# Patient Record
Sex: FEMALE | Race: WHITE | NOT HISPANIC OR LATINO
[De-identification: names, ages, dates, MRNs, and addresses within clinical notes are randomized per-mention and may not be internally consistent; named-entity substitution may affect disease eponyms.]

---

## 2017-02-13 ENCOUNTER — APPOINTMENT (OUTPATIENT)
Dept: ORTHOPEDIC SURGERY | Facility: CLINIC | Age: 62
End: 2017-02-13

## 2017-03-04 ENCOUNTER — EMERGENCY (EMERGENCY)
Facility: HOSPITAL | Age: 62
LOS: 1 days | Discharge: HOME | End: 2017-03-04
Attending: EMERGENCY MEDICINE | Admitting: EMERGENCY MEDICINE
Payer: COMMERCIAL

## 2017-03-04 VITALS
TEMPERATURE: 99 F | HEART RATE: 86 BPM | RESPIRATION RATE: 18 BRPM | OXYGEN SATURATION: 96 % | SYSTOLIC BLOOD PRESSURE: 119 MMHG | DIASTOLIC BLOOD PRESSURE: 84 MMHG

## 2017-03-04 DIAGNOSIS — K57.32 DIVERTICULITIS OF LARGE INTESTINE WITHOUT PERFORATION OR ABSCESS WITHOUT BLEEDING: ICD-10-CM

## 2017-03-04 DIAGNOSIS — Z90.49 ACQUIRED ABSENCE OF OTHER SPECIFIED PARTS OF DIGESTIVE TRACT: ICD-10-CM

## 2017-03-04 DIAGNOSIS — E78.5 HYPERLIPIDEMIA, UNSPECIFIED: ICD-10-CM

## 2017-03-04 DIAGNOSIS — Z90.89 ACQUIRED ABSENCE OF OTHER ORGANS: ICD-10-CM

## 2017-03-04 DIAGNOSIS — R10.32 LEFT LOWER QUADRANT PAIN: ICD-10-CM

## 2017-03-04 DIAGNOSIS — Z90.710 ACQUIRED ABSENCE OF BOTH CERVIX AND UTERUS: ICD-10-CM

## 2017-03-04 LAB
ALBUMIN SERPL ELPH-MCNC: 4.4 G/DL — SIGNIFICANT CHANGE UP (ref 3.3–5)
ALP SERPL-CCNC: 68 U/L — SIGNIFICANT CHANGE UP (ref 40–120)
ALT FLD-CCNC: 16 U/L RC — SIGNIFICANT CHANGE UP (ref 10–45)
ANION GAP SERPL CALC-SCNC: 16 MMOL/L — SIGNIFICANT CHANGE UP (ref 5–17)
APTT BLD: 27.9 SEC — SIGNIFICANT CHANGE UP (ref 27.5–37.4)
AST SERPL-CCNC: 61 U/L — HIGH (ref 10–40)
BASOPHILS # BLD AUTO: 0 K/UL — SIGNIFICANT CHANGE UP (ref 0–0.2)
BASOPHILS NFR BLD AUTO: 0.2 % — SIGNIFICANT CHANGE UP (ref 0–2)
BILIRUB SERPL-MCNC: 0.5 MG/DL — SIGNIFICANT CHANGE UP (ref 0.2–1.2)
BUN SERPL-MCNC: 17 MG/DL — SIGNIFICANT CHANGE UP (ref 7–23)
CALCIUM SERPL-MCNC: 9.1 MG/DL — SIGNIFICANT CHANGE UP (ref 8.4–10.5)
CHLORIDE SERPL-SCNC: 100 MMOL/L — SIGNIFICANT CHANGE UP (ref 96–108)
CO2 SERPL-SCNC: 22 MMOL/L — SIGNIFICANT CHANGE UP (ref 22–31)
CREAT SERPL-MCNC: 0.77 MG/DL — SIGNIFICANT CHANGE UP (ref 0.5–1.3)
EOSINOPHIL # BLD AUTO: 0 K/UL — SIGNIFICANT CHANGE UP (ref 0–0.5)
EOSINOPHIL NFR BLD AUTO: 0.3 % — SIGNIFICANT CHANGE UP (ref 0–6)
GAS PNL BLDV: SIGNIFICANT CHANGE UP
GLUCOSE SERPL-MCNC: 103 MG/DL — HIGH (ref 70–99)
HCT VFR BLD CALC: 39 % — SIGNIFICANT CHANGE UP (ref 34.5–45)
HGB BLD-MCNC: 14.1 G/DL — SIGNIFICANT CHANGE UP (ref 11.5–15.5)
INR BLD: 0.99 RATIO — SIGNIFICANT CHANGE UP (ref 0.88–1.16)
LIDOCAIN IGE QN: 51 U/L — SIGNIFICANT CHANGE UP (ref 7–60)
LYMPHOCYTES # BLD AUTO: 1.2 K/UL — SIGNIFICANT CHANGE UP (ref 1–3.3)
LYMPHOCYTES # BLD AUTO: 10.5 % — LOW (ref 13–44)
MAGNESIUM SERPL-MCNC: 2 MG/DL — SIGNIFICANT CHANGE UP (ref 1.6–2.6)
MCHC RBC-ENTMCNC: 33.9 PG — SIGNIFICANT CHANGE UP (ref 27–34)
MCHC RBC-ENTMCNC: 36.1 GM/DL — HIGH (ref 32–36)
MCV RBC AUTO: 93.8 FL — SIGNIFICANT CHANGE UP (ref 80–100)
MONOCYTES # BLD AUTO: 0.8 K/UL — SIGNIFICANT CHANGE UP (ref 0–0.9)
MONOCYTES NFR BLD AUTO: 6.7 % — SIGNIFICANT CHANGE UP (ref 2–14)
NEUTROPHILS # BLD AUTO: 9.2 K/UL — HIGH (ref 1.8–7.4)
NEUTROPHILS NFR BLD AUTO: 82.3 % — HIGH (ref 43–77)
PHOSPHATE SERPL-MCNC: 2.9 MG/DL — SIGNIFICANT CHANGE UP (ref 2.5–4.5)
PLATELET # BLD AUTO: 232 K/UL — SIGNIFICANT CHANGE UP (ref 150–400)
POTASSIUM SERPL-MCNC: 4.1 MMOL/L — SIGNIFICANT CHANGE UP (ref 3.5–5.3)
POTASSIUM SERPL-SCNC: 4.1 MMOL/L — SIGNIFICANT CHANGE UP (ref 3.5–5.3)
PROT SERPL-MCNC: 7 G/DL — SIGNIFICANT CHANGE UP (ref 6–8.3)
PROTHROM AB SERPL-ACNC: 10.8 SEC — SIGNIFICANT CHANGE UP (ref 10–13.1)
RBC # BLD: 4.16 M/UL — SIGNIFICANT CHANGE UP (ref 3.8–5.2)
RBC # FLD: 12.2 % — SIGNIFICANT CHANGE UP (ref 10.3–14.5)
SODIUM SERPL-SCNC: 138 MMOL/L — SIGNIFICANT CHANGE UP (ref 135–145)
WBC # BLD: 11.2 K/UL — HIGH (ref 3.8–10.5)
WBC # FLD AUTO: 11.2 K/UL — HIGH (ref 3.8–10.5)

## 2017-03-04 PROCEDURE — 93010 ELECTROCARDIOGRAM REPORT: CPT

## 2017-03-04 PROCEDURE — 74177 CT ABD & PELVIS W/CONTRAST: CPT | Mod: 26

## 2017-03-04 PROCEDURE — 99285 EMERGENCY DEPT VISIT HI MDM: CPT | Mod: 25

## 2017-03-04 RX ORDER — ONDANSETRON 8 MG/1
4 TABLET, FILM COATED ORAL ONCE
Qty: 0 | Refills: 0 | Status: COMPLETED | OUTPATIENT
Start: 2017-03-04 | End: 2017-03-04

## 2017-03-04 RX ORDER — MORPHINE SULFATE 50 MG/1
4 CAPSULE, EXTENDED RELEASE ORAL ONCE
Qty: 0 | Refills: 0 | Status: DISCONTINUED | OUTPATIENT
Start: 2017-03-04 | End: 2017-03-04

## 2017-03-04 RX ORDER — SODIUM CHLORIDE 9 MG/ML
2000 INJECTION INTRAMUSCULAR; INTRAVENOUS; SUBCUTANEOUS ONCE
Qty: 0 | Refills: 0 | Status: COMPLETED | OUTPATIENT
Start: 2017-03-04 | End: 2017-03-04

## 2017-03-04 RX ADMIN — SODIUM CHLORIDE 4000 MILLILITER(S): 9 INJECTION INTRAMUSCULAR; INTRAVENOUS; SUBCUTANEOUS at 22:26

## 2017-03-04 RX ADMIN — MORPHINE SULFATE 4 MILLIGRAM(S): 50 CAPSULE, EXTENDED RELEASE ORAL at 22:28

## 2017-03-04 RX ADMIN — ONDANSETRON 4 MILLIGRAM(S): 8 TABLET, FILM COATED ORAL at 21:58

## 2017-03-04 RX ADMIN — MORPHINE SULFATE 4 MILLIGRAM(S): 50 CAPSULE, EXTENDED RELEASE ORAL at 21:58

## 2017-03-04 NOTE — ED PROVIDER NOTE - ATTENDING CONTRIBUTION TO CARE
**ATTENDING ADDENDUM (Dr. Yoel Winter): I attest that I have directly examined this patient and reviewed and formulated the diagnostic and therapeutic management plan in collaboration with the EM resident. Please see MDM note and remainder of EMR for findings from CC, HPI, ROS, and PE. (STEFAN Landis)

## 2017-03-04 NOTE — ED PROVIDER NOTE - OBJECTIVE STATEMENT
61 year old female with PMHx of diverticulitis "6 times," HLD, PSHx of appendectomy, hysterectomy, presenting today with lower abdominal pain radiating to her lower back and rectum. Vomited twice, no diarrhea, no constipation, no fever, no dysuria, did not take anything for pain. 61 year old female with PMHx of diverticulitis "6 times," HLD, PSHx of appendectomy, hysterectomy, presenting today with lower abdominal pain radiating to her lower back and rectum. Vomited twice, no diarrhea, no constipation, no fever, no dysuria, did not take anything for pain.  **ATTENDING ADDENDUM (Dr. Yoel Winter): I attest that I have directly and personally interviewed and examined this patient and elicited a comparable history of present illness and review of systems as documented, along with my EM resident. I attest that I have made significant contributions to the documentation where necessary and as noted in the EMR. Of note, and in addition to the above, patient is a 61-year-old woman who follows with primary care physician/provider Du/Froilan who presents today with progressively worsening lower abdominal pain and tenderness. Reports multiple prior episodes of diverticulitis. POSITIVE history of hyperlipidemia, appendectomy, hysterectomy. NO history or memory of small bowel obstruction or perforation reported. POSITIVE relieved with antibiotics (sometimes inpatient, sometimes outpatient). NO fevers or chills. POSITIVE nausea. POSITIVE movement-associated, reproducible pain (with palpation and with percussion). NO recent antibiotics. DENIES recent travel. NO obvious sick contacts. Here for evaluation. VAS 4-5/10.

## 2017-03-04 NOTE — ED PROVIDER NOTE - CONDUCTED A DETAILED DISCUSSION WITH PATIENT AND/OR GUARDIAN REGARDING, MDM
lab results/**ATTENDING ADDENDUM (Dr. Yoel Winter): Anticipatory guidance provided./radiology results

## 2017-03-04 NOTE — ED PROVIDER NOTE - PLAN OF CARE
ATTENDING ADDENDUM (Dr. Yoel Winter): Goals of care include resolution of emergent/urgent symptoms and concerns, and restoration to baseline level of homeostasis. Take antibiotics (cipro and flagyl) as directed for 2 weeks. Drink plenty of fluids and get plenty of rest. Follow up with your primary doctor on Monday. Take Tylenol 1000 mg or ibuprofen 600 mg every 6 hours with food as needed for pain. Take oxycodone 5 mg every 6 hours as needed for severe pain with over-the-counter laxatives; do not drive or drink alcohol while taking this medication. Take Zofran 4mg dissolved under the tongue every 8 hours as needed for nausea. Return to the Emergency Dept if you develop any new or worsening symptoms

## 2017-03-04 NOTE — ED ADULT NURSE NOTE - PMH
Diverticulitis    Dyslipidemia    Endometriosis    Melanoma    Migraine headache    Mitral valve prolapse

## 2017-03-04 NOTE — ED PROVIDER NOTE - RESPIRATORY, MLM
Breath sounds clear and equal bilaterally. Breath sounds clear and equal bilaterally. **ATTENDING ADDENDUM (Dr. Yoel Winter): NO wheezing, rales, rhonchi, crackles, stridor, drooling, retractions, nasal flaring, or tripoding.

## 2017-03-04 NOTE — ED PROVIDER NOTE - PHYSICAL EXAMINATION
**ATTENDING ADDENDUM (Dr. Yoel Winter): I have reviewed and substantially contributed to the elements of the PE as documented above. I have directly performed an examination of this patient in conjunction with the other members (EM resident/PA/NP) of the patient care team. Of note, and in addition to the above, patient's abdominal exam reveals a soft but tender abdomen. POSITIVE tenderness with palpation and percussion along the bilateral lower quadrants and the suprapubic area ( left lower quadrant site of worst pain ). NO Hamm. NO pulsatile or non-pulsatile masses. NO hernias. NO obvious hepatosplenomegaly. POSITIVE guarding but without rigidity or rebound tenderness.

## 2017-03-04 NOTE — ED PROVIDER NOTE - GASTROINTESTINAL [+], MLM
ABDOMINAL PAIN/VOMITING ABDOMINAL PAIN/**ATTENDING ADDENDUM (Dr. Yoel Winter): lower quadrant abdominal pain/VOMITING

## 2017-03-04 NOTE — ED PROVIDER NOTE - PROGRESS NOTE DETAILS
**ATTENDING ADDENDUM (Dr. Yoel Winter): patient serially evaluated throughout ED course. NO acute deterioration up to this time in the ED. POSITIVE received analgesics, intravenous fluid rehydration, and antiemetics. Feeling improved, but with pain and tenderness. Agree with additional intravenous fluid rehydration and ice chips. Anticipatory guidance provided. Awaiting completion of ED diagnostics. Will continue to observe and monitor closely. **ATTENDING ADDENDUM (Dr. Yoel Winter): patient serially evaluated throughout ED course. NO acute deterioration up to this time in the ED. Formal reading of CT noted. All ED diagnostics reviewed with patient and with family. Agree with first dose of antibiotics (ciprofloxacin and metronidazole IV) in ED, with discharge home and close outpatient followup with primary care physician/provider while on oral antibiotics. Patient prefers discharge home over admission at this time. I believe patient is a candidate for outpatient management and followup. Return instructions provided at time of patient discharge. Anticipatory guidance provided. NO evidence of acute perforation, abscess, peritonitis, or acute surgical abdomen requiring admission at this time. **ATTENDING ADDENDUM (Dr. Yoel Winter): patient serially evaluated throughout ED course. NO acute deterioration up to this time in the ED. Received analgesics, intravenous fluid rehydration, and antiemetics. Feeling improved, but with pain and tenderness. Agree with additional intravenous fluid rehydration and ice chips. Anticipatory guidance provided. Awaiting completion of ED diagnostics. Will continue to observe and monitor closely.

## 2017-03-04 NOTE — ED ADULT NURSE NOTE - PSH
S/P appendectomy    S/P bladder repair    S/P     S/P hysterectomy    S/P reconstruction procedure  S/P Melanoma. Nose reconstruction surgery.

## 2017-03-04 NOTE — ED PROVIDER NOTE - EYES, MLM
Clear bilaterally, pupils equal, round and reactive to light. Clear bilaterally, pupils equal, round and reactive to light. **ATTENDING ADDENDUM (Dr. Yoel Winter): Extraocular muscle movements intact. NO nystagmus.

## 2017-03-04 NOTE — ED ADULT NURSE NOTE - OBJECTIVE STATEMENT
61 y.o female aaox3 ambulatory with c/o low abdominal pain radiating to back and rectal area just started today, h/o Diverticulitis, endometriosis, appendectomy, hysterectomy, C/S and HTN, with low grade temperature and nausea and vomiting. Patient denies any diarrhea or constipation. also c/o blood streak vomitus earlier.

## 2017-03-04 NOTE — ED PROVIDER NOTE - MEDICAL DECISION MAKING DETAILS
61 year old female presenting with lower abdominal pain that feels like prior episodes of diverticulitis. PMD Ryan Mcgovern 61 year old female presenting with lower abdominal pain that feels like prior episodes of diverticulitis. PMD Ryan Mcgovern  **ATTENDING MEDICAL DECISION MAKING/SYNTHESIS (Dr. Yoel Winter): I have reviewed the Chief Complaint, the HPI, the ROS, and have directly performed and confirmed the findings on the Physical Examination. I have reviewed the medical decision making with all providers, as applicable. The PROBLEM REPRESENTATION at this time is: 61-year-old woman with history of appendectomy, hysterectomy, hyperlipidemia, and multiple prior episodes of diverticulitis now with lower abdominal pain across both lower quadrants similar to prior exacerbations of diverticulitis. The MOST LIKELY DIAGNOSIS, and the LIST OF DIFFERENTIAL DIAGNOSES, includes (but is not limited to) the following: recurrent diverticulitis, perforation, peritonitis, other equivalent cause for acute surgical abdomen, other serious bacterial infection or sepsis/severe sepsis e.g. abscess (possible), small bowel obstruction, large bowel obstruction, volvulus, or acute intussusception (unlikely given presentation and clinical findings), gynecological/vascular etiology (unlikely given presentation and clinical findings), mesenteric ischemia or ischemic colitis (unlikely given presentation and clinical findings). The likelihood of each of these diagnoses has been appropriately considered in the context of this patient's presentation and my evaluation. PLAN: as described in EMR, including diagnostics, therapeutics and consultation as clinically warranted. I will continue to reevaluate the patient, including the results of all testing, and monitor response to therapy throughout the patient's course in the ED.

## 2017-03-04 NOTE — ED PROVIDER NOTE - CARE PLAN
Principal Discharge DX:	Left lower quadrant pain Principal Discharge DX:	Left lower quadrant pain  Goal:	ATTENDING ADDENDUM (Dr. Yoel Winter): Goals of care include resolution of emergent/urgent symptoms and concerns, and restoration to baseline level of homeostasis. Principal Discharge DX:	Left lower quadrant pain  Goal:	ATTENDING ADDENDUM (Dr. Yoel Winter): Goals of care include resolution of emergent/urgent symptoms and concerns, and restoration to baseline level of homeostasis.  Instructions for follow-up, activity and diet:	Take antibiotics (cipro and flagyl) as directed for 2 weeks. Drink plenty of fluids and get plenty of rest. Follow up with your primary doctor on Monday. Take Tylenol 1000 mg or ibuprofen 600 mg every 6 hours with food as needed for pain. Take oxycodone 5 mg every 6 hours as needed for severe pain with over-the-counter laxatives; do not drive or drink alcohol while taking this medication. Take Zofran 4mg dissolved under the tongue every 8 hours as needed for nausea. Return to the Emergency Dept if you develop any new or worsening symptoms Principal Discharge DX:	Left lower quadrant pain  Goal:	ATTENDING ADDENDUM (Dr. Yoel Winter): Goals of care include resolution of emergent/urgent symptoms and concerns, and restoration to baseline level of homeostasis.  Instructions for follow-up, activity and diet:	Take antibiotics (cipro and flagyl) as directed for 2 weeks. Drink plenty of fluids and get plenty of rest. Follow up with your primary doctor on Monday. Take Tylenol 1000 mg or ibuprofen 600 mg every 6 hours with food as needed for pain. Take oxycodone 5 mg every 6 hours as needed for severe pain with over-the-counter laxatives; do not drive or drink alcohol while taking this medication. Take Zofran 4mg dissolved under the tongue every 8 hours as needed for nausea. Return to the Emergency Dept if you develop any new or worsening symptoms  Secondary Diagnosis:	Diverticulitis of large intestine without perforation or abscess without bleeding

## 2017-03-05 VITALS
RESPIRATION RATE: 16 BRPM | TEMPERATURE: 99 F | DIASTOLIC BLOOD PRESSURE: 65 MMHG | SYSTOLIC BLOOD PRESSURE: 102 MMHG | OXYGEN SATURATION: 98 % | HEART RATE: 92 BPM

## 2017-03-05 LAB
APPEARANCE UR: CLEAR — SIGNIFICANT CHANGE UP
BILIRUB UR-MCNC: NEGATIVE — SIGNIFICANT CHANGE UP
COLOR SPEC: COLORLESS — SIGNIFICANT CHANGE UP
DIFF PNL FLD: NEGATIVE — SIGNIFICANT CHANGE UP
GLUCOSE UR QL: NEGATIVE — SIGNIFICANT CHANGE UP
KETONES UR-MCNC: NEGATIVE — SIGNIFICANT CHANGE UP
LEUKOCYTE ESTERASE UR-ACNC: NEGATIVE — SIGNIFICANT CHANGE UP
NITRITE UR-MCNC: NEGATIVE — SIGNIFICANT CHANGE UP
PH UR: 6.5 — SIGNIFICANT CHANGE UP (ref 4.8–8)
PROT UR-MCNC: NEGATIVE — SIGNIFICANT CHANGE UP
RBC CASTS # UR COMP ASSIST: SIGNIFICANT CHANGE UP /HPF (ref 0–2)
SP GR SPEC: 1.02 — SIGNIFICANT CHANGE UP (ref 1.01–1.02)
UROBILINOGEN FLD QL: NEGATIVE — SIGNIFICANT CHANGE UP
WBC UR QL: SIGNIFICANT CHANGE UP /HPF (ref 0–5)

## 2017-03-05 PROCEDURE — 74177 CT ABD & PELVIS W/CONTRAST: CPT

## 2017-03-05 PROCEDURE — 83690 ASSAY OF LIPASE: CPT

## 2017-03-05 PROCEDURE — 84295 ASSAY OF SERUM SODIUM: CPT

## 2017-03-05 PROCEDURE — 81001 URINALYSIS AUTO W/SCOPE: CPT

## 2017-03-05 PROCEDURE — 99284 EMERGENCY DEPT VISIT MOD MDM: CPT | Mod: 25

## 2017-03-05 PROCEDURE — 93005 ELECTROCARDIOGRAM TRACING: CPT

## 2017-03-05 PROCEDURE — 84132 ASSAY OF SERUM POTASSIUM: CPT

## 2017-03-05 PROCEDURE — 96374 THER/PROPH/DIAG INJ IV PUSH: CPT | Mod: XU

## 2017-03-05 PROCEDURE — 96375 TX/PRO/DX INJ NEW DRUG ADDON: CPT | Mod: XU

## 2017-03-05 PROCEDURE — 83605 ASSAY OF LACTIC ACID: CPT

## 2017-03-05 PROCEDURE — 82947 ASSAY GLUCOSE BLOOD QUANT: CPT

## 2017-03-05 PROCEDURE — 82330 ASSAY OF CALCIUM: CPT

## 2017-03-05 PROCEDURE — 96376 TX/PRO/DX INJ SAME DRUG ADON: CPT | Mod: XU

## 2017-03-05 PROCEDURE — 85730 THROMBOPLASTIN TIME PARTIAL: CPT

## 2017-03-05 PROCEDURE — 85014 HEMATOCRIT: CPT

## 2017-03-05 PROCEDURE — 84100 ASSAY OF PHOSPHORUS: CPT

## 2017-03-05 PROCEDURE — 82803 BLOOD GASES ANY COMBINATION: CPT

## 2017-03-05 PROCEDURE — 85610 PROTHROMBIN TIME: CPT

## 2017-03-05 PROCEDURE — 85027 COMPLETE CBC AUTOMATED: CPT

## 2017-03-05 PROCEDURE — 82435 ASSAY OF BLOOD CHLORIDE: CPT

## 2017-03-05 PROCEDURE — 83735 ASSAY OF MAGNESIUM: CPT

## 2017-03-05 PROCEDURE — 80053 COMPREHEN METABOLIC PANEL: CPT

## 2017-03-05 RX ORDER — MOXIFLOXACIN HYDROCHLORIDE TABLETS, 400 MG 400 MG/1
1 TABLET, FILM COATED ORAL
Qty: 28 | Refills: 0 | OUTPATIENT
Start: 2017-03-05 | End: 2017-03-19

## 2017-03-05 RX ORDER — METRONIDAZOLE 500 MG
500 TABLET ORAL ONCE
Qty: 0 | Refills: 0 | Status: COMPLETED | OUTPATIENT
Start: 2017-03-05 | End: 2017-03-05

## 2017-03-05 RX ORDER — MORPHINE SULFATE 50 MG/1
4 CAPSULE, EXTENDED RELEASE ORAL ONCE
Qty: 0 | Refills: 0 | Status: DISCONTINUED | OUTPATIENT
Start: 2017-03-05 | End: 2017-03-05

## 2017-03-05 RX ORDER — ONDANSETRON 8 MG/1
1 TABLET, FILM COATED ORAL
Qty: 9 | Refills: 0 | OUTPATIENT
Start: 2017-03-05 | End: 2017-03-08

## 2017-03-05 RX ORDER — OXYCODONE HYDROCHLORIDE 5 MG/1
1 TABLET ORAL
Qty: 9 | Refills: 0 | OUTPATIENT
Start: 2017-03-05 | End: 2017-03-08

## 2017-03-05 RX ORDER — METRONIDAZOLE 500 MG
1 TABLET ORAL
Qty: 42 | Refills: 0 | OUTPATIENT
Start: 2017-03-05 | End: 2017-03-19

## 2017-03-05 RX ORDER — CIPROFLOXACIN LACTATE 400MG/40ML
400 VIAL (ML) INTRAVENOUS ONCE
Qty: 0 | Refills: 0 | Status: COMPLETED | OUTPATIENT
Start: 2017-03-05 | End: 2017-03-05

## 2017-03-05 RX ADMIN — MORPHINE SULFATE 4 MILLIGRAM(S): 50 CAPSULE, EXTENDED RELEASE ORAL at 01:03

## 2017-03-05 RX ADMIN — Medication 200 MILLIGRAM(S): at 00:36

## 2017-03-05 RX ADMIN — Medication 100 MILLIGRAM(S): at 00:35

## 2017-03-05 RX ADMIN — MORPHINE SULFATE 4 MILLIGRAM(S): 50 CAPSULE, EXTENDED RELEASE ORAL at 00:42

## 2017-03-05 NOTE — ED ADULT NURSE REASSESSMENT NOTE - NS ED NURSE REASSESS COMMENT FT1
patient in stable condition, still c/o abdominal pain, CT scan resulted and due antibiotics started.  spoke to patient and explained that she will be dc home with antibiotics and pain medication and to ff-up with her GI doctor and verbalized understanding.

## 2017-04-24 ENCOUNTER — APPOINTMENT (OUTPATIENT)
Dept: ORTHOPEDIC SURGERY | Facility: CLINIC | Age: 62
End: 2017-04-24

## 2017-04-24 VITALS
WEIGHT: 150 LBS | HEART RATE: 72 BPM | SYSTOLIC BLOOD PRESSURE: 114 MMHG | BODY MASS INDEX: 23.27 KG/M2 | HEIGHT: 67.5 IN | DIASTOLIC BLOOD PRESSURE: 79 MMHG

## 2017-04-24 DIAGNOSIS — M67.431 GANGLION, RIGHT WRIST: ICD-10-CM

## 2017-04-25 RX ORDER — FUROSEMIDE 20 MG/1
20 TABLET ORAL
Qty: 30 | Refills: 0 | Status: ACTIVE | COMMUNITY
Start: 2017-04-03

## 2017-04-25 RX ORDER — MELOXICAM 15 MG/1
15 TABLET ORAL
Qty: 30 | Refills: 1 | Status: ACTIVE | COMMUNITY
Start: 2017-04-25 | End: 1900-01-01

## 2017-04-25 RX ORDER — ONDANSETRON 4 MG/1
4 TABLET, ORALLY DISINTEGRATING ORAL
Qty: 9 | Refills: 0 | Status: ACTIVE | COMMUNITY
Start: 2017-03-05

## 2017-07-21 ENCOUNTER — APPOINTMENT (OUTPATIENT)
Dept: ORTHOPEDIC SURGERY | Facility: HOSPITAL | Age: 62
End: 2017-07-21

## 2018-07-18 ENCOUNTER — APPOINTMENT (OUTPATIENT)
Dept: ORTHOPEDIC SURGERY | Facility: CLINIC | Age: 63
End: 2018-07-18
Payer: COMMERCIAL

## 2018-07-18 VITALS
HEIGHT: 67.5 IN | HEART RATE: 66 BPM | SYSTOLIC BLOOD PRESSURE: 108 MMHG | DIASTOLIC BLOOD PRESSURE: 73 MMHG | BODY MASS INDEX: 22.96 KG/M2 | WEIGHT: 148 LBS

## 2018-07-18 DIAGNOSIS — M75.41 IMPINGEMENT SYNDROME OF RIGHT SHOULDER: ICD-10-CM

## 2018-07-18 PROCEDURE — 73030 X-RAY EXAM OF SHOULDER: CPT | Mod: RT

## 2018-07-18 PROCEDURE — 20610 DRAIN/INJ JOINT/BURSA W/O US: CPT | Mod: RT

## 2018-07-18 PROCEDURE — 99214 OFFICE O/P EST MOD 30 MIN: CPT | Mod: 25

## 2018-07-18 RX ORDER — METHYLPRED ACET/NACL,ISO-OS/PF 40 MG/ML
40 VIAL (ML) INJECTION
Qty: 1 | Refills: 0 | Status: COMPLETED | OUTPATIENT
Start: 2018-07-18

## 2018-07-18 RX ORDER — LIDOCAINE HYDROCHLORIDE 10 MG/ML
1 INJECTION, SOLUTION INFILTRATION; PERINEURAL
Refills: 0 | Status: COMPLETED | OUTPATIENT
Start: 2018-07-18

## 2018-07-18 RX ADMIN — METHYLPREDNISOLONE ACETATE 2 MG/ML: 40 INJECTION, SUSPENSION INTRALESIONAL; INTRAMUSCULAR; INTRASYNOVIAL; SOFT TISSUE at 00:00

## 2018-07-18 RX ADMIN — LIDOCAINE HYDROCHLORIDE 3 %: 10 INJECTION, SOLUTION EPIDURAL; INFILTRATION; INTRACAUDAL; PERINEURAL at 00:00

## 2018-11-22 ENCOUNTER — EMERGENCY (EMERGENCY)
Facility: HOSPITAL | Age: 63
LOS: 1 days | Discharge: HOME | End: 2018-11-22
Attending: EMERGENCY MEDICINE | Admitting: EMERGENCY MEDICINE
Payer: COMMERCIAL

## 2018-11-22 VITALS
TEMPERATURE: 98 F | HEART RATE: 78 BPM | DIASTOLIC BLOOD PRESSURE: 73 MMHG | RESPIRATION RATE: 16 BRPM | SYSTOLIC BLOOD PRESSURE: 110 MMHG | OXYGEN SATURATION: 100 %

## 2018-11-22 VITALS
SYSTOLIC BLOOD PRESSURE: 134 MMHG | RESPIRATION RATE: 16 BRPM | DIASTOLIC BLOOD PRESSURE: 84 MMHG | TEMPERATURE: 98 F | OXYGEN SATURATION: 100 % | HEART RATE: 83 BPM

## 2018-11-22 PROBLEM — K57.92 DIVERTICULITIS OF INTESTINE, PART UNSPECIFIED, WITHOUT PERFORATION OR ABSCESS WITHOUT BLEEDING: Chronic | Status: ACTIVE | Noted: 2017-03-04

## 2018-11-22 LAB
ALBUMIN SERPL ELPH-MCNC: 4.3 G/DL — SIGNIFICANT CHANGE UP (ref 3.3–5)
ALP SERPL-CCNC: 72 U/L — SIGNIFICANT CHANGE UP (ref 40–120)
ALT FLD-CCNC: 9 U/L — SIGNIFICANT CHANGE UP (ref 4–33)
APPEARANCE UR: CLEAR — SIGNIFICANT CHANGE UP
AST SERPL-CCNC: 56 U/L — HIGH (ref 4–32)
BACTERIA # UR AUTO: NEGATIVE — SIGNIFICANT CHANGE UP
BASE EXCESS BLDV CALC-SCNC: 1 MMOL/L — SIGNIFICANT CHANGE UP
BASOPHILS # BLD AUTO: 0.04 K/UL — SIGNIFICANT CHANGE UP (ref 0–0.2)
BASOPHILS NFR BLD AUTO: 0.4 % — SIGNIFICANT CHANGE UP (ref 0–2)
BILIRUB SERPL-MCNC: 0.3 MG/DL — SIGNIFICANT CHANGE UP (ref 0.2–1.2)
BILIRUB UR-MCNC: NEGATIVE — SIGNIFICANT CHANGE UP
BLOOD GAS VENOUS - CREATININE: 0.81 MG/DL — SIGNIFICANT CHANGE UP (ref 0.5–1.3)
BLOOD UR QL VISUAL: SIGNIFICANT CHANGE UP
BUN SERPL-MCNC: 19 MG/DL — SIGNIFICANT CHANGE UP (ref 7–23)
CALCIUM SERPL-MCNC: 9.2 MG/DL — SIGNIFICANT CHANGE UP (ref 8.4–10.5)
CHLORIDE BLDV-SCNC: 103 MMOL/L — SIGNIFICANT CHANGE UP (ref 96–108)
CHLORIDE SERPL-SCNC: 102 MMOL/L — SIGNIFICANT CHANGE UP (ref 98–107)
CO2 SERPL-SCNC: 24 MMOL/L — SIGNIFICANT CHANGE UP (ref 22–31)
COLOR SPEC: YELLOW — SIGNIFICANT CHANGE UP
CREAT SERPL-MCNC: 0.82 MG/DL — SIGNIFICANT CHANGE UP (ref 0.5–1.3)
EOSINOPHIL # BLD AUTO: 0.07 K/UL — SIGNIFICANT CHANGE UP (ref 0–0.5)
EOSINOPHIL NFR BLD AUTO: 0.7 % — SIGNIFICANT CHANGE UP (ref 0–6)
GAS PNL BLDV: 137 MMOL/L — SIGNIFICANT CHANGE UP (ref 136–146)
GLUCOSE BLDV-MCNC: 75 — SIGNIFICANT CHANGE UP (ref 70–99)
GLUCOSE SERPL-MCNC: 78 MG/DL — SIGNIFICANT CHANGE UP (ref 70–99)
GLUCOSE UR-MCNC: NEGATIVE — SIGNIFICANT CHANGE UP
HCO3 BLDV-SCNC: 24 MMOL/L — SIGNIFICANT CHANGE UP (ref 20–27)
HCT VFR BLD CALC: 38.7 % — SIGNIFICANT CHANGE UP (ref 34.5–45)
HCT VFR BLDV CALC: 27.8 % — LOW (ref 34.5–45)
HGB BLD-MCNC: 13 G/DL — SIGNIFICANT CHANGE UP (ref 11.5–15.5)
HGB BLDV-MCNC: 9 G/DL — LOW (ref 11.5–15.5)
IMM GRANULOCYTES # BLD AUTO: 0.04 # — SIGNIFICANT CHANGE UP
IMM GRANULOCYTES NFR BLD AUTO: 0.4 % — SIGNIFICANT CHANGE UP (ref 0–1.5)
KETONES UR-MCNC: SIGNIFICANT CHANGE UP
LACTATE BLDV-MCNC: 1.1 MMOL/L — SIGNIFICANT CHANGE UP (ref 0.5–2)
LEUKOCYTE ESTERASE UR-ACNC: SIGNIFICANT CHANGE UP
LIDOCAIN IGE QN: 52.6 U/L — SIGNIFICANT CHANGE UP (ref 7–60)
LYMPHOCYTES # BLD AUTO: 1.74 K/UL — SIGNIFICANT CHANGE UP (ref 1–3.3)
LYMPHOCYTES # BLD AUTO: 16.4 % — SIGNIFICANT CHANGE UP (ref 13–44)
MCHC RBC-ENTMCNC: 31 PG — SIGNIFICANT CHANGE UP (ref 27–34)
MCHC RBC-ENTMCNC: 33.6 % — SIGNIFICANT CHANGE UP (ref 32–36)
MCV RBC AUTO: 92.4 FL — SIGNIFICANT CHANGE UP (ref 80–100)
MONOCYTES # BLD AUTO: 0.72 K/UL — SIGNIFICANT CHANGE UP (ref 0–0.9)
MONOCYTES NFR BLD AUTO: 6.8 % — SIGNIFICANT CHANGE UP (ref 2–14)
NEUTROPHILS # BLD AUTO: 7.98 K/UL — HIGH (ref 1.8–7.4)
NEUTROPHILS NFR BLD AUTO: 75.3 % — SIGNIFICANT CHANGE UP (ref 43–77)
NITRITE UR-MCNC: NEGATIVE — SIGNIFICANT CHANGE UP
NRBC # FLD: 0 — SIGNIFICANT CHANGE UP
PCO2 BLDV: 49 MMHG — SIGNIFICANT CHANGE UP (ref 41–51)
PH BLDV: 7.34 PH — SIGNIFICANT CHANGE UP (ref 7.32–7.43)
PH UR: 6 — SIGNIFICANT CHANGE UP (ref 5–8)
PLATELET # BLD AUTO: 275 K/UL — SIGNIFICANT CHANGE UP (ref 150–400)
PMV BLD: 9.6 FL — SIGNIFICANT CHANGE UP (ref 7–13)
PO2 BLDV: 27 MMHG — LOW (ref 35–40)
POTASSIUM BLDV-SCNC: 3.6 MMOL/L — SIGNIFICANT CHANGE UP (ref 3.4–4.5)
POTASSIUM SERPL-MCNC: 3.8 MMOL/L — SIGNIFICANT CHANGE UP (ref 3.5–5.3)
POTASSIUM SERPL-SCNC: 3.8 MMOL/L — SIGNIFICANT CHANGE UP (ref 3.5–5.3)
PROT SERPL-MCNC: 7.2 G/DL — SIGNIFICANT CHANGE UP (ref 6–8.3)
PROT UR-MCNC: SIGNIFICANT CHANGE UP
RBC # BLD: 4.19 M/UL — SIGNIFICANT CHANGE UP (ref 3.8–5.2)
RBC # FLD: 12.6 % — SIGNIFICANT CHANGE UP (ref 10.3–14.5)
RBC CASTS # UR COMP ASSIST: SIGNIFICANT CHANGE UP (ref 0–?)
SAO2 % BLDV: 42.9 % — LOW (ref 60–85)
SODIUM SERPL-SCNC: 137 MMOL/L — SIGNIFICANT CHANGE UP (ref 135–145)
SP GR SPEC: 1.03 — SIGNIFICANT CHANGE UP (ref 1–1.04)
SQUAMOUS # UR AUTO: SIGNIFICANT CHANGE UP
UROBILINOGEN FLD QL: NORMAL — SIGNIFICANT CHANGE UP
WBC # BLD: 10.59 K/UL — HIGH (ref 3.8–10.5)
WBC # FLD AUTO: 10.59 K/UL — HIGH (ref 3.8–10.5)
WBC UR QL: SIGNIFICANT CHANGE UP (ref 0–?)

## 2018-11-22 PROCEDURE — 99285 EMERGENCY DEPT VISIT HI MDM: CPT

## 2018-11-22 PROCEDURE — 74177 CT ABD & PELVIS W/CONTRAST: CPT | Mod: 26

## 2018-11-22 RX ORDER — ACETAMINOPHEN 500 MG
975 TABLET ORAL ONCE
Qty: 0 | Refills: 0 | Status: COMPLETED | OUTPATIENT
Start: 2018-11-22 | End: 2018-11-22

## 2018-11-22 RX ORDER — CIPROFLOXACIN LACTATE 400MG/40ML
1 VIAL (ML) INTRAVENOUS
Qty: 28 | Refills: 0 | OUTPATIENT
Start: 2018-11-22 | End: 2018-12-05

## 2018-11-22 RX ORDER — OXYCODONE HYDROCHLORIDE 5 MG/1
1 TABLET ORAL
Qty: 20 | Refills: 0 | OUTPATIENT
Start: 2018-11-22 | End: 2018-11-24

## 2018-11-22 RX ORDER — METRONIDAZOLE 500 MG
1 TABLET ORAL
Qty: 42 | Refills: 0 | OUTPATIENT
Start: 2018-11-22 | End: 2018-12-05

## 2018-11-22 RX ORDER — CIPROFLOXACIN LACTATE 400MG/40ML
500 VIAL (ML) INTRAVENOUS ONCE
Qty: 0 | Refills: 0 | Status: COMPLETED | OUTPATIENT
Start: 2018-11-22 | End: 2018-11-22

## 2018-11-22 RX ORDER — ONDANSETRON 8 MG/1
4 TABLET, FILM COATED ORAL ONCE
Qty: 0 | Refills: 0 | Status: COMPLETED | OUTPATIENT
Start: 2018-11-22 | End: 2018-11-22

## 2018-11-22 RX ORDER — SODIUM CHLORIDE 9 MG/ML
1000 INJECTION INTRAMUSCULAR; INTRAVENOUS; SUBCUTANEOUS ONCE
Qty: 0 | Refills: 0 | Status: COMPLETED | OUTPATIENT
Start: 2018-11-22 | End: 2018-11-22

## 2018-11-22 RX ORDER — MORPHINE SULFATE 50 MG/1
4 CAPSULE, EXTENDED RELEASE ORAL ONCE
Qty: 0 | Refills: 0 | Status: DISCONTINUED | OUTPATIENT
Start: 2018-11-22 | End: 2018-11-22

## 2018-11-22 RX ORDER — METRONIDAZOLE 500 MG
500 TABLET ORAL ONCE
Qty: 0 | Refills: 0 | Status: COMPLETED | OUTPATIENT
Start: 2018-11-22 | End: 2018-11-22

## 2018-11-22 RX ADMIN — MORPHINE SULFATE 4 MILLIGRAM(S): 50 CAPSULE, EXTENDED RELEASE ORAL at 16:45

## 2018-11-22 RX ADMIN — Medication 500 MILLIGRAM(S): at 21:55

## 2018-11-22 RX ADMIN — MORPHINE SULFATE 4 MILLIGRAM(S): 50 CAPSULE, EXTENDED RELEASE ORAL at 16:36

## 2018-11-22 RX ADMIN — SODIUM CHLORIDE 2000 MILLILITER(S): 9 INJECTION INTRAMUSCULAR; INTRAVENOUS; SUBCUTANEOUS at 16:37

## 2018-11-22 RX ADMIN — Medication 975 MILLIGRAM(S): at 21:55

## 2018-11-22 RX ADMIN — ONDANSETRON 4 MILLIGRAM(S): 8 TABLET, FILM COATED ORAL at 16:37

## 2018-11-22 NOTE — ED ADULT NURSE NOTE - OBJECTIVE STATEMENT
Pt a&ox3 c/o LUQ abdominal pain, tender to palpation, states pain similar to diverticulitis flare up, pt denies sob breathing even and unlabored, denies cp/discomfort, denies headache/dizziness, abd soft, tender, non-distended, denies n/v/d, skin is cool dry and intact, ivl placed, labs drawn and sent, will continue to monitor.

## 2018-11-22 NOTE — ED PROVIDER NOTE - MEDICAL DECISION MAKING DETAILS
LLQ pain and tenderness c/w diverticulitis however pt feels this is the worst flare she has had in a while so will gt CT to r/o abscess or perforation. Basic labs, NPO, analgesia and antiemetics PRN.

## 2018-11-22 NOTE — ED ADULT TRIAGE NOTE - CHIEF COMPLAINT QUOTE
pt amb to triage c/o diverticulitis flare, nausea w/ no episodes of emesis, softer than baseline stool associated w/ lower abdm cramping today, advised by GI to come to ED for eval

## 2018-11-22 NOTE — ED ADULT NURSE REASSESSMENT NOTE - NS ED NURSE REASSESS COMMENT FT1
Pt a/o x 3. Pt discharged by Dr. monroe  . Pt given written and verbal instructions by MD. Iv removed as per hospital protocol. Pt ambulated to exit with family.

## 2018-11-22 NOTE — ED PROVIDER NOTE - PROGRESS NOTE DETAILS
Took signout on patient. CT showing uncomplicated sigmoid diverticulitis. Pt is adamant about going home on PO antibiotics, tolerating PO, does not want to stay in hospital though she was offered bowel rest and pain control inpatient. Discussed case with pt's PMD, Dr. Ryan Mcgovern, who is ok with d/c and wants pt to f/u with him in his office this upcoming Monday or Tuesday.

## 2018-11-22 NOTE — ED PROVIDER NOTE - ATTENDING CONTRIBUTION TO CARE
ELEN CABEZAS, MD: 64 yo F with a past medical history of recurrent episodic diverticulitis presents with achy LLQ pain radiating to the back, severity is 10/10, a/w multiple episodes of diarrhea/loose bowel movements and with some nausea without vomiting.  Patient denies fevers or chills. Denies previous abdominal surgeries.  Patient has been considering elective resection secondary to multiple episodes of diverticulitis.  Patient denies HA, NP, chest pain, SOB, cough, weakness, dizziness, urinary symptoms, extremity pain or swelling or other complaints.     PHYSICAL EXAM:  Vital signs reviewed.  GENERAL: Patient is awake and alert and in no acute distress.  Non-toxic appearing.  A+Ox4  HEAD:  Airway patent.  No oropharyngeal edema.  No stridor.  Auricles are normal.    EYES: EOM grossly intact, conjunctiva non-injected and sclera clear  NECK: Supple, No vertebral point tenderness to palpation.  CHEST/LUNG: Lungs clear to auscultation bilaterally; no wheeze, no rhonchi,  no rales.    HEART: Regular rate and rhythm;   ABDOMEN: Soft, diffuse mildly tender to palpation.  No rebound/no guarding.  Bowel sounds present x 4 with hyperactive BS in LLQ.   MSK/EXTREMITIES: No clubbing or cyanosis. Back is nontender, with no vertebral point tenderness to palpation, no CVAT.  Moving all 4 extremities.     NEURO: Neurologically grossly intact.   No obvious deficits.   PSYCH: Psychiatrically normal mood and affect.  No apparent risk to self or others.       DR. ROCK, ATTENDING MD:    I performed a face to face bedside interview with patient regarding history of present illness, review of symptoms and past medical history. I completed an independent physical exam.  I have discussed patient's plan of care with the team of health care providers.   I agree with note as stated above, having amended the EMR as needed to reflect my findings. I have discussed the assessment and plan of care.  This includes during the time I functioned as the attending physician for this patient. ELEN CABEZAS MD: 64 yo F with a past medical history of recurrent episodic diverticulitis, melanoma, migraines, endometriosis, hyperlipidemia, MVP, C/S, hysterectomy, appendectomy, bladder repair presents with achy LLQ pain radiating to the back, severity is 10/10, a/w multiple episodes of diarrhea/loose bowel movements and with some nausea without vomiting.  Patient denies fevers or chills. Denies previous abdominal surgeries.  Patient has been considering elective resection secondary to multiple episodes of diverticulitis.  Patient denies HA, NP, chest pain, SOB, cough, weakness, dizziness, urinary symptoms, extremity pain or swelling or other complaints.     PHYSICAL EXAM:  Vital signs reviewed.  GENERAL: Patient is awake and alert and in no acute distress.  Non-toxic appearing.  A+Ox4  HEAD:  Airway patent.  No oropharyngeal edema.  No stridor.  Auricles are normal.    EYES: EOM grossly intact, conjunctiva non-injected and sclera clear  NECK: Supple, No vertebral point tenderness to palpation.  CHEST/LUNG: Lungs clear to auscultation bilaterally; no wheeze, no rhonchi,  no rales.    HEART: Regular rate and rhythm;   ABDOMEN: Soft, diffuse mildly tender to palpation.  No rebound/no guarding.  Bowel sounds present x 4 with hyperactive BS in LLQ.   MSK/EXTREMITIES: No clubbing or cyanosis. Back is nontender, with no vertebral point tenderness to palpation, no CVAT.  Moving all 4 extremities.     NEURO: Neurologically grossly intact.   No obvious deficits.   PSYCH: Psychiatrically normal mood and affect.  No apparent risk to self or others.       DR. ROCK, ATTENDING MD:    I performed a face to face bedside interview with patient regarding history of present illness, review of symptoms and past medical history. I completed an independent physical exam.  I have discussed patient's plan of care with the team of health care providers.   I agree with note as stated above, having amended the EMR as needed to reflect my findings. I have discussed the assessment and plan of care.  This includes during the time I functioned as the attending physician for this patient.

## 2018-11-22 NOTE — ED PROVIDER NOTE - OBJECTIVE STATEMENT
63yof w/ multiple bouts of diverticulitis has a gradual onset of dull, aching LLQ abdominal pain radiating to the back, gradually worsening in severity, associated w/ frequent, loose bowel movements and mild nausea. No fevers or chills. Feels like prior diverticulitis. No abdominal surgeries. No urinary symptoms or pelvic pain.

## 2018-11-22 NOTE — ED ADULT NURSE NOTE - NSIMPLEMENTINTERV_GEN_ALL_ED
Implemented All Universal Safety Interventions:  [place] to call system. Call bell, personal items and telephone within reach. Instruct patient to call for assistance. Room bathroom lighting operational. Non-slip footwear when patient is off stretcher. Physically safe environment: no spills, clutter or unnecessary equipment. Stretcher in lowest position, wheels locked, appropriate side rails in place.

## 2018-11-22 NOTE — ED ADULT NURSE REASSESSMENT NOTE - NS ED NURSE REASSESS COMMENT FT1
Received report from lele JORDAN. Pt is a/o x 3. Vital signs as noted im flow sheet. No complaints of chest pain, headache, nausea, dizziness, vomiting  SOB, fever, chills verbalized. Pt has 20 g to right AC with no redness or swelling noted. Pt states pain and nausea has improved. Pt awaiting CT results. Will continue to monitor.

## 2018-12-05 ENCOUNTER — APPOINTMENT (OUTPATIENT)
Dept: COLORECTAL SURGERY | Facility: CLINIC | Age: 63
End: 2018-12-05
Payer: COMMERCIAL

## 2018-12-05 VITALS
SYSTOLIC BLOOD PRESSURE: 107 MMHG | DIASTOLIC BLOOD PRESSURE: 70 MMHG | OXYGEN SATURATION: 99 % | WEIGHT: 148 LBS | RESPIRATION RATE: 14 BRPM | BODY MASS INDEX: 22.96 KG/M2 | HEIGHT: 67.5 IN | HEART RATE: 79 BPM

## 2018-12-05 DIAGNOSIS — Z80.42 FAMILY HISTORY OF MALIGNANT NEOPLASM OF PROSTATE: ICD-10-CM

## 2018-12-05 PROCEDURE — 99245 OFF/OP CONSLTJ NEW/EST HI 55: CPT

## 2018-12-05 RX ORDER — BUTALBITAL, ACETAMINOPHEN, AND CAFFEINE 50; 300; 40 MG/1; MG/1; MG/1
CAPSULE ORAL
Refills: 0 | Status: ACTIVE | COMMUNITY

## 2018-12-05 RX ORDER — CHOLECALCIFEROL (VITAMIN D3) 25 MCG
TABLET ORAL
Refills: 0 | Status: ACTIVE | COMMUNITY

## 2018-12-05 RX ORDER — PNV NO.95/FERROUS FUM/FOLIC AC 28MG-0.8MG
TABLET ORAL
Refills: 0 | Status: ACTIVE | COMMUNITY

## 2018-12-05 RX ORDER — POTASSIUM CHLORIDE 750 MG/1
10 TABLET, FILM COATED, EXTENDED RELEASE ORAL
Qty: 30 | Refills: 0 | Status: DISCONTINUED | COMMUNITY
Start: 2017-04-03 | End: 2018-12-05

## 2018-12-05 RX ORDER — VITAMIN E ACID SUCCINATE 268 MG
TABLET ORAL
Refills: 0 | Status: ACTIVE | COMMUNITY

## 2018-12-05 RX ORDER — ACETAMINOPHEN 325 MG
TABLET ORAL
Refills: 0 | Status: ACTIVE | COMMUNITY

## 2018-12-05 RX ORDER — SUMATRIPTAN 100 MG/1
100 TABLET, FILM COATED ORAL
Qty: 9 | Refills: 0 | Status: DISCONTINUED | COMMUNITY
Start: 2016-12-15 | End: 2018-12-05

## 2019-01-09 ENCOUNTER — TRANSCRIPTION ENCOUNTER (OUTPATIENT)
Age: 64
End: 2019-01-09

## 2019-01-10 ENCOUNTER — APPOINTMENT (OUTPATIENT)
Dept: COLORECTAL SURGERY | Facility: CLINIC | Age: 64
End: 2019-01-10
Payer: COMMERCIAL

## 2019-01-10 ENCOUNTER — INPATIENT (INPATIENT)
Facility: HOSPITAL | Age: 64
LOS: 5 days | Discharge: HOME | DRG: 907 | End: 2019-01-16
Attending: SURGERY | Admitting: SURGERY
Payer: COMMERCIAL

## 2019-01-10 ENCOUNTER — RESULT REVIEW (OUTPATIENT)
Age: 64
End: 2019-01-10

## 2019-01-10 VITALS
HEART RATE: 90 BPM | RESPIRATION RATE: 18 BRPM | TEMPERATURE: 98 F | DIASTOLIC BLOOD PRESSURE: 59 MMHG | SYSTOLIC BLOOD PRESSURE: 97 MMHG | OXYGEN SATURATION: 98 %

## 2019-01-10 DIAGNOSIS — R19.8 OTHER SPECIFIED SYMPTOMS AND SIGNS INVOLVING THE DIGESTIVE SYSTEM AND ABDOMEN: ICD-10-CM

## 2019-01-10 LAB
ALBUMIN SERPL ELPH-MCNC: 4.7 G/DL — SIGNIFICANT CHANGE UP (ref 3.3–5)
ALP SERPL-CCNC: 63 U/L — SIGNIFICANT CHANGE UP (ref 40–120)
ALT FLD-CCNC: 12 U/L — SIGNIFICANT CHANGE UP (ref 10–45)
ANION GAP SERPL CALC-SCNC: 13 MMOL/L — SIGNIFICANT CHANGE UP (ref 5–17)
APTT BLD: 22.9 SEC — LOW (ref 27.5–36.3)
AST SERPL-CCNC: 50 U/L — HIGH (ref 10–40)
BASOPHILS # BLD AUTO: 0 K/UL — SIGNIFICANT CHANGE UP (ref 0–0.2)
BASOPHILS NFR BLD AUTO: 0.3 % — SIGNIFICANT CHANGE UP (ref 0–2)
BILIRUB SERPL-MCNC: 0.4 MG/DL — SIGNIFICANT CHANGE UP (ref 0.2–1.2)
BLD GP AB SCN SERPL QL: NEGATIVE — SIGNIFICANT CHANGE UP
BUN SERPL-MCNC: 11 MG/DL — SIGNIFICANT CHANGE UP (ref 7–23)
CALCIUM SERPL-MCNC: 9.4 MG/DL — SIGNIFICANT CHANGE UP (ref 8.4–10.5)
CHLORIDE SERPL-SCNC: 104 MMOL/L — SIGNIFICANT CHANGE UP (ref 96–108)
CO2 SERPL-SCNC: 23 MMOL/L — SIGNIFICANT CHANGE UP (ref 22–31)
CREAT SERPL-MCNC: 0.67 MG/DL — SIGNIFICANT CHANGE UP (ref 0.5–1.3)
EOSINOPHIL # BLD AUTO: 0 K/UL — SIGNIFICANT CHANGE UP (ref 0–0.5)
EOSINOPHIL NFR BLD AUTO: 0.4 % — SIGNIFICANT CHANGE UP (ref 0–6)
GAS PNL BLDV: SIGNIFICANT CHANGE UP
GLUCOSE SERPL-MCNC: 102 MG/DL — HIGH (ref 70–99)
HCT VFR BLD CALC: 43.3 % — SIGNIFICANT CHANGE UP (ref 34.5–45)
HGB BLD-MCNC: 15.2 G/DL — SIGNIFICANT CHANGE UP (ref 11.5–15.5)
INR BLD: 0.97 RATIO — SIGNIFICANT CHANGE UP (ref 0.88–1.16)
LYMPHOCYTES # BLD AUTO: 0.7 K/UL — LOW (ref 1–3.3)
LYMPHOCYTES # BLD AUTO: 8.8 % — LOW (ref 13–44)
MCHC RBC-ENTMCNC: 32.4 PG — SIGNIFICANT CHANGE UP (ref 27–34)
MCHC RBC-ENTMCNC: 35.2 GM/DL — SIGNIFICANT CHANGE UP (ref 32–36)
MCV RBC AUTO: 92.1 FL — SIGNIFICANT CHANGE UP (ref 80–100)
MONOCYTES # BLD AUTO: 0.2 K/UL — SIGNIFICANT CHANGE UP (ref 0–0.9)
MONOCYTES NFR BLD AUTO: 3 % — SIGNIFICANT CHANGE UP (ref 2–14)
NEUTROPHILS # BLD AUTO: 6.8 K/UL — SIGNIFICANT CHANGE UP (ref 1.8–7.4)
NEUTROPHILS NFR BLD AUTO: 87.5 % — HIGH (ref 43–77)
PLATELET # BLD AUTO: 235 K/UL — SIGNIFICANT CHANGE UP (ref 150–400)
POTASSIUM SERPL-MCNC: 3.6 MMOL/L — SIGNIFICANT CHANGE UP (ref 3.5–5.3)
POTASSIUM SERPL-SCNC: 3.6 MMOL/L — SIGNIFICANT CHANGE UP (ref 3.5–5.3)
PROT SERPL-MCNC: 7.1 G/DL — SIGNIFICANT CHANGE UP (ref 6–8.3)
PROTHROM AB SERPL-ACNC: 11.1 SEC — SIGNIFICANT CHANGE UP (ref 10–12.9)
RBC # BLD: 4.7 M/UL — SIGNIFICANT CHANGE UP (ref 3.8–5.2)
RBC # FLD: 12 % — SIGNIFICANT CHANGE UP (ref 10.3–14.5)
RH IG SCN BLD-IMP: NEGATIVE — SIGNIFICANT CHANGE UP
SODIUM SERPL-SCNC: 140 MMOL/L — SIGNIFICANT CHANGE UP (ref 135–145)
WBC # BLD: 7.8 K/UL — SIGNIFICANT CHANGE UP (ref 3.8–10.5)
WBC # FLD AUTO: 7.8 K/UL — SIGNIFICANT CHANGE UP (ref 3.8–10.5)

## 2019-01-10 PROCEDURE — 71045 X-RAY EXAM CHEST 1 VIEW: CPT | Mod: 26

## 2019-01-10 PROCEDURE — 44145 PARTIAL REMOVAL OF COLON: CPT

## 2019-01-10 PROCEDURE — 88307 TISSUE EXAM BY PATHOLOGIST: CPT | Mod: 26

## 2019-01-10 PROCEDURE — 49084 PERITONEAL LAVAGE: CPT | Mod: 59

## 2019-01-10 PROCEDURE — 99285 EMERGENCY DEPT VISIT HI MDM: CPT

## 2019-01-10 PROCEDURE — 49320 DIAG LAPARO SEPARATE PROC: CPT | Mod: 59

## 2019-01-10 PROCEDURE — 45378 DIAGNOSTIC COLONOSCOPY: CPT | Mod: 59

## 2019-01-10 RX ORDER — PIPERACILLIN AND TAZOBACTAM 4; .5 G/20ML; G/20ML
3.38 INJECTION, POWDER, LYOPHILIZED, FOR SOLUTION INTRAVENOUS EVERY 8 HOURS
Qty: 0 | Refills: 0 | Status: DISCONTINUED | OUTPATIENT
Start: 2019-01-10 | End: 2019-01-12

## 2019-01-10 RX ORDER — SODIUM CHLORIDE 9 MG/ML
500 INJECTION, SOLUTION INTRAVENOUS ONCE
Qty: 0 | Refills: 0 | Status: COMPLETED | OUTPATIENT
Start: 2019-01-10 | End: 2019-01-11

## 2019-01-10 RX ORDER — MORPHINE SULFATE 50 MG/1
4 CAPSULE, EXTENDED RELEASE ORAL ONCE
Qty: 0 | Refills: 0 | Status: DISCONTINUED | OUTPATIENT
Start: 2019-01-10 | End: 2019-01-10

## 2019-01-10 RX ORDER — HYDROMORPHONE HYDROCHLORIDE 2 MG/ML
0.5 INJECTION INTRAMUSCULAR; INTRAVENOUS; SUBCUTANEOUS EVERY 4 HOURS
Qty: 0 | Refills: 0 | Status: DISCONTINUED | OUTPATIENT
Start: 2019-01-10 | End: 2019-01-11

## 2019-01-10 RX ORDER — KETOROLAC TROMETHAMINE 30 MG/ML
30 SYRINGE (ML) INJECTION EVERY 6 HOURS
Qty: 0 | Refills: 0 | Status: DISCONTINUED | OUTPATIENT
Start: 2019-01-10 | End: 2019-01-11

## 2019-01-10 RX ORDER — ONDANSETRON 8 MG/1
4 TABLET, FILM COATED ORAL ONCE
Qty: 0 | Refills: 0 | Status: COMPLETED | OUTPATIENT
Start: 2019-01-10 | End: 2019-01-10

## 2019-01-10 RX ORDER — PIPERACILLIN AND TAZOBACTAM 4; .5 G/20ML; G/20ML
3.38 INJECTION, POWDER, LYOPHILIZED, FOR SOLUTION INTRAVENOUS ONCE
Qty: 0 | Refills: 0 | Status: COMPLETED | OUTPATIENT
Start: 2019-01-10 | End: 2019-01-10

## 2019-01-10 RX ORDER — HYDROMORPHONE HYDROCHLORIDE 2 MG/ML
1 INJECTION INTRAMUSCULAR; INTRAVENOUS; SUBCUTANEOUS EVERY 4 HOURS
Qty: 0 | Refills: 0 | Status: DISCONTINUED | OUTPATIENT
Start: 2019-01-10 | End: 2019-01-11

## 2019-01-10 RX ORDER — DEXTROSE MONOHYDRATE, SODIUM CHLORIDE, AND POTASSIUM CHLORIDE 50; .745; 4.5 G/1000ML; G/1000ML; G/1000ML
1000 INJECTION, SOLUTION INTRAVENOUS
Qty: 0 | Refills: 0 | Status: DISCONTINUED | OUTPATIENT
Start: 2019-01-10 | End: 2019-01-11

## 2019-01-10 RX ORDER — ACETAMINOPHEN 500 MG
1000 TABLET ORAL ONCE
Qty: 0 | Refills: 0 | Status: COMPLETED | OUTPATIENT
Start: 2019-01-11 | End: 2019-01-11

## 2019-01-10 RX ORDER — HYDROMORPHONE HYDROCHLORIDE 2 MG/ML
0.5 INJECTION INTRAMUSCULAR; INTRAVENOUS; SUBCUTANEOUS
Qty: 0 | Refills: 0 | Status: DISCONTINUED | OUTPATIENT
Start: 2019-01-10 | End: 2019-01-11

## 2019-01-10 RX ORDER — HYDROMORPHONE HYDROCHLORIDE 2 MG/ML
1 INJECTION INTRAMUSCULAR; INTRAVENOUS; SUBCUTANEOUS
Qty: 0 | Refills: 0 | Status: DISCONTINUED | OUTPATIENT
Start: 2019-01-10 | End: 2019-01-11

## 2019-01-10 RX ORDER — ASPIRIN/CALCIUM CARB/MAGNESIUM 324 MG
1 TABLET ORAL
Qty: 0 | Refills: 0 | COMMUNITY

## 2019-01-10 RX ORDER — ACETAMINOPHEN 500 MG
1000 TABLET ORAL ONCE
Qty: 0 | Refills: 0 | Status: COMPLETED | OUTPATIENT
Start: 2019-01-10 | End: 2019-01-11

## 2019-01-10 RX ORDER — ACETAMINOPHEN 500 MG
1000 TABLET ORAL ONCE
Qty: 0 | Refills: 0 | Status: COMPLETED | OUTPATIENT
Start: 2019-01-10 | End: 2019-01-10

## 2019-01-10 RX ORDER — SODIUM CHLORIDE 9 MG/ML
500 INJECTION INTRAMUSCULAR; INTRAVENOUS; SUBCUTANEOUS ONCE
Qty: 0 | Refills: 0 | Status: COMPLETED | OUTPATIENT
Start: 2019-01-10 | End: 2019-01-10

## 2019-01-10 RX ORDER — ENOXAPARIN SODIUM 100 MG/ML
40 INJECTION SUBCUTANEOUS DAILY
Qty: 0 | Refills: 0 | Status: DISCONTINUED | OUTPATIENT
Start: 2019-01-10 | End: 2019-01-16

## 2019-01-10 RX ORDER — SIMVASTATIN 20 MG/1
1 TABLET, FILM COATED ORAL
Qty: 0 | Refills: 0 | COMMUNITY

## 2019-01-10 RX ADMIN — MORPHINE SULFATE 4 MILLIGRAM(S): 50 CAPSULE, EXTENDED RELEASE ORAL at 11:24

## 2019-01-10 RX ADMIN — PIPERACILLIN AND TAZOBACTAM 25 GRAM(S): 4; .5 INJECTION, POWDER, LYOPHILIZED, FOR SOLUTION INTRAVENOUS at 21:17

## 2019-01-10 RX ADMIN — PIPERACILLIN AND TAZOBACTAM 200 GRAM(S): 4; .5 INJECTION, POWDER, LYOPHILIZED, FOR SOLUTION INTRAVENOUS at 11:42

## 2019-01-10 RX ADMIN — SODIUM CHLORIDE 1000 MILLILITER(S): 9 INJECTION, SOLUTION INTRAVENOUS at 23:50

## 2019-01-10 RX ADMIN — Medication 400 MILLIGRAM(S): at 23:19

## 2019-01-10 RX ADMIN — SODIUM CHLORIDE 1000 MILLILITER(S): 9 INJECTION INTRAMUSCULAR; INTRAVENOUS; SUBCUTANEOUS at 20:55

## 2019-01-10 RX ADMIN — ONDANSETRON 4 MILLIGRAM(S): 8 TABLET, FILM COATED ORAL at 11:24

## 2019-01-10 RX ADMIN — DEXTROSE MONOHYDRATE, SODIUM CHLORIDE, AND POTASSIUM CHLORIDE 100 MILLILITER(S): 50; .745; 4.5 INJECTION, SOLUTION INTRAVENOUS at 20:30

## 2019-01-10 NOTE — H&P ADULT - ASSESSMENT
Assessment/Plan: 63y Female with pneumoperitoneum following colonoscopy.     - Added on emergently to OR for exploratory laparotomy, poss ostomy  - Antibiotics given in ED  - Preoperative labs sent    Seen with Dr. Ko  Pager [phone]

## 2019-01-10 NOTE — ED ADULT NURSE NOTE - OBJECTIVE STATEMENT
62 yo female brought to ED by EMS from colonoscopy center s/p colonoscopy, patient developed severe abdominal pain after procedure, as well as nausea and vomiting. Upon arrival to ED, patient in moderate distress 2/2 pain, pt medicated for pain and nausea, taked directly to CXR which showed free air in abdomen, patient currently being prepped for OR, family at bedside

## 2019-01-10 NOTE — H&P ADULT - HISTORY OF PRESENT ILLNESS
63-year-old woman with MV prolapse, planning for surgery for diverticulitis in January underwent a pre-operative colonoscopy on 1/10/19 and had diffuse lower abdominal pain after the procedure.   On ASA 81mg daily.   PSH includes hysterectomy for endometriosis, appendectomy, bladder suspension.

## 2019-01-10 NOTE — H&P ADULT - NSHPPHYSICALEXAM_GEN_ALL_CORE
Vital Signs Last 24 Hrs  T(C): 36.6 (10 Mauricio 2019 11:05), Max: 36.6 (10 Mauricio 2019 11:05)  T(F): 97.9 (10 Mauricio 2019 11:05), Max: 97.9 (10 Mauricio 2019 11:05)  HR: 95 (10 Mauricio 2019 11:58) (90 - 95)  BP: 97/62 (10 Mauricio 2019 11:58) (97/59 - 97/62)  BP(mean): --  RR: 20 (10 Mauricio 2019 11:58) (18 - 20)  SpO2: 99% (10 Mauricio 2019 11:58) (98% - 99%)    General: A&Ox3, acute distress from pain  Neuro: Motor and sensory grossly intact with no focal deficits.  HEENT: Anicteric sclerae.  Respiratory: Unlabored breathing.   CVS: Regular rate and rhythm.  Abdomen: Diffusely tender  Extremities: Warm bilaterally w/ palpable pulses.   MSK: Intact ROM.

## 2019-01-10 NOTE — H&P ADULT - NSHPLABSRESULTS_GEN_ALL_CORE
CBC Full  -  ( 10 Mauricio 2019 11:40 )  WBC Count : 7.8 K/uL  Hemoglobin : 15.2 g/dL  Hematocrit : 43.3 %  Platelet Count - Automated : 235 K/uL  Mean Cell Volume : 92.1 fl  Mean Cell Hemoglobin : 32.4 pg  Mean Cell Hemoglobin Concentration : 35.2 gm/dL  Auto Neutrophil # : 6.8 K/uL  Auto Lymphocyte # : 0.7 K/uL  Auto Monocyte # : 0.2 K/uL  Auto Eosinophil # : 0.0 K/uL  Auto Basophil # : 0.0 K/uL  Auto Neutrophil % : 87.5 %  Auto Lymphocyte % : 8.8 %  Auto Monocyte % : 3.0 %  Auto Eosinophil % : 0.4 %  Auto Basophil % : 0.3 %          RADIOLOGY:  Chest X-ray: pneumoperitoneum

## 2019-01-10 NOTE — ED PROVIDER NOTE - PROGRESS NOTE DETAILS
Resident: Kimani Lomeli - Took pt for upright xray imediately. Free air seen on Xr. Called back Dr. Ko at his cell [phone]. He arrived at bedside at 11:37 on the phone with OR for emergency surgery. Pt is stable complaining of mild abdominal pain after morphine and zofran.

## 2019-01-10 NOTE — ED PROVIDER NOTE - OBJECTIVE STATEMENT
Pt is 63 Y F here from outpatient colonoscopy 1 hour ago. Immediately after waking up from anesthesia was having severe abdominal pain was brought directly to ED for evaluation. Bedside eval with diffuse pain taken directly to xray for eval for free air. Pt is 63 Y F here from outpatient colonoscopy 1 hour ago. Immediately after waking up from anesthesia was having severe abdominal pain was brought directly to ED for evaluation. Bedside eval with diffuse pain taken directly to xray for eval for free air.    Diaz:  abdominal pain s/p colonoscopy today.  concern for perf.  surgeon in the ED to take to OR after conformaiton

## 2019-01-10 NOTE — CHART NOTE - NSCHARTNOTEFT_GEN_A_CORE
Post-operative Check    SUBJECTIVE: No acute events in the immediate post-operative period. Pain well controlled. SBP noted to be in the low 90s. Patient and family confirmed that her BP normally runs low. Patient was given a 500cc bolus of NS with mild improvement. Patient is asymptomatic, denying light headedness or fatigue.     OBJECTIVE:  T(C): 36.2 (01-10-19 @ 16:55), Max: 36.6 (01-10-19 @ 11:05)  HR: 78 (01-10-19 @ 17:30) (78 - 95)  BP: 101/55 (01-10-19 @ 17:30) (97/59 - 118/67)  RR: 18 (01-10-19 @ 17:30) (12 - 21)  SpO2: 97% (01-10-19 @ 17:30) (96% - 99%)        Physical Exam:   General: well developed, well nourished, NAD  Neuro: alert and oriented, no focal deficits, moves all extremities spontaneously  HEENT: NCAT, EOMI, anicteric, mucosa moist  Respiratory: airway patent, respirations unlabored  CVS: regular rate and rhythm  Abdomen: soft, nontender, nondistended  Extremities: no edema, sensation and movement grossly intact  Abdomen: soft, appropriate incisional tenderness, midline   : avitia in place draining yellow urine  Skin: warm, dry, appropriate color      ASSESSMENT:   JOHN HURST is a 63y Female POD#0 from LAR for perforated diverticulitis    PLAN:  - Pain management - IV tylenol RTC, dilaudid injectable   - Monitor BP overnight  - avitia in place. Follow UOP  - cont zosyn (sigmoid perforated in OR, no spillage)  - wound care: packing in between staples in abdomen. Packing can be removed on 1/11 in the afternoon.   - Appropriate for transfer to the floor Post-operative Check    SUBJECTIVE: No acute events in the immediate post-operative period. Pain well controlled. SBP noted to be in the low 90s. Patient and family confirmed that her BP normally runs low. Patient was given a 500cc bolus of NS with mild improvement. Patient is asymptomatic, denying light handedness or fatigue.       Update at 1:00am 1/11  Patient continues to remain hypotensive with SBP dropping to 78. HR stable to the 80s. Anesthesia provided two 500 mL LR boluses with only a moderate improvement in BP.  BP continues to remain soft. Case discussed with senior resident, Dr. Belia Hackett. SICU consult placed with Dr. Leonard.      OBJECTIVE:  T(C): 36.2 (01-10-19 @ 16:55), Max: 36.6 (01-10-19 @ 11:05)  HR: 78 (01-10-19 @ 17:30) (78 - 95)  BP: 101/55 (01-10-19 @ 17:30) (97/59 - 118/67)  RR: 18 (01-10-19 @ 17:30) (12 - 21)  SpO2: 97% (01-10-19 @ 17:30) (96% - 99%)        Physical Exam:   General: well developed, well nourished, NAD  Neuro: alert and oriented, no focal deficits, moves all extremities spontaneously  HEENT: NCAT, EOMI, anicteric, mucosa moist  Respiratory: airway patent, respirations unlabored  CVS: regular rate and rhythm  Abdomen: soft, nontender, nondistended  Extremities: no edema, sensation and movement grossly intact  Abdomen: soft, appropriate incisional tenderness, midline   : avitia in place draining yellow urine  Skin: warm, dry, appropriate color      ASSESSMENT:   JOHN HURST is a 63y Female POD#0 from LAR for perforated diverticulitis    PLAN:  - f/u stat cbc bmp, mg, and phos   - Pain management - IV tylenol RTC, dilaudid injectable   - Monitor BP overnight  - avitia in place. Follow UOP  - cont zosyn (sigmoid perforated in OR, no spillage)  - wound care: packing in between staples in abdomen. Packing can be removed on 1/11 in the afternoon.   - SICU consult for hypotension

## 2019-01-10 NOTE — BRIEF OPERATIVE NOTE - PROCEDURE
<<-----Click on this checkbox to enter Procedure Low anterior resection  01/10/2019    Active  [id number]

## 2019-01-10 NOTE — PRE-ANESTHESIA EVALUATION ADULT - NSANTHPEFT_GEN_ALL_CORE
Gen: Patient appears to be in pain, holding abdomen, AAOx3  CV:WNL  Resp: WNL  Ext: warm well perfused

## 2019-01-10 NOTE — BRIEF OPERATIVE NOTE - OPERATION/FINDINGS
Patient with colonic perforation after colonoscopy. Midline incision made below umbilicus. After lysis of adhesions in pelvis from multiple previous surgeries and endometriosis, small bowel was able to be retracted cephalad and rectosigmoid could be inspected. Perforation found in diseased part of sigmoid, about 1cm in diameter. No gross spillage encountered. Patient had had a prep for colonoscopy. After identifying a healthy segment of descending colon and healthy rectum, we performed our resection. To free up the healthy part of rectum and prepare it for anastomosis the vaginal cuff which had come to be adhesed to anterior surface of rectum had to be dissected off. This was done without injuring either organ. End to end stapled anastomosis performed. Bubble study negative for leak, proctoscopy performed and anastomosis noted to be intact from within lumen. After hemostasis was confirmed, we proceeded to wash out the abdomen. Fascia closed with interrupted monofilament figure of 8 stitches, skin stapled and subcutaneous tissue packed.

## 2019-01-10 NOTE — ED ADULT NURSE NOTE - NSIMPLEMENTINTERV_GEN_ALL_ED
Implemented All Universal Safety Interventions:  [place] to call system. Call bell, personal items and telephone within reach. Instruct patient to call for assistance. Room bathroom lighting operational. Non-slip footwear when patient is off stretcher. Physically safe environment: no spills, clutter or unnecessary equipment. Stretcher in lowest position, wheels locked, appropriate side rails in place.

## 2019-01-10 NOTE — ED PROVIDER NOTE - PHYSICAL EXAMINATION
Diaz:  General: No distress.  Mentation at baseline.   HEENT: WNL  Chest/Lungs: CTAB, No wheeze, No retractions, No increased work of breathing, Normal rate  Heart: S1S2 RRR, No M/R/G, Pules equal Bilaterally in upper and lower extremities distally  Abd: soft, tender diffuse,  rebound.  No hernias, no palpable masses.  Extrem: FROM in all joints, no significant edema noted, No ulcers.  Cap refil < 2sec.  Skin: No rash noted, warm dry.  Neuro:  Grossly normal.  No difficulty ambulating. No focal deficits.  Psychiatric: No evidence of delusions. No SI/HI.

## 2019-01-10 NOTE — ED PROVIDER NOTE - ATTENDING CONTRIBUTION TO CARE
Emily:  I have independently evaluated the patient and have documented in the appropriate sections above.  I agree with the exam and plan as noted above.

## 2019-01-11 LAB
ANION GAP SERPL CALC-SCNC: 11 MMOL/L — SIGNIFICANT CHANGE UP (ref 5–17)
ANION GAP SERPL CALC-SCNC: 12 MMOL/L — SIGNIFICANT CHANGE UP (ref 5–17)
BUN SERPL-MCNC: 7 MG/DL — SIGNIFICANT CHANGE UP (ref 7–23)
BUN SERPL-MCNC: 8 MG/DL — SIGNIFICANT CHANGE UP (ref 7–23)
CA-I BLD-SCNC: 1.1 MMOL/L — LOW (ref 1.12–1.3)
CALCIUM SERPL-MCNC: 7.6 MG/DL — LOW (ref 8.4–10.5)
CALCIUM SERPL-MCNC: 7.9 MG/DL — LOW (ref 8.4–10.5)
CHLORIDE SERPL-SCNC: 105 MMOL/L — SIGNIFICANT CHANGE UP (ref 96–108)
CHLORIDE SERPL-SCNC: 107 MMOL/L — SIGNIFICANT CHANGE UP (ref 96–108)
CO2 SERPL-SCNC: 20 MMOL/L — LOW (ref 22–31)
CO2 SERPL-SCNC: 20 MMOL/L — LOW (ref 22–31)
CREAT SERPL-MCNC: 0.59 MG/DL — SIGNIFICANT CHANGE UP (ref 0.5–1.3)
CREAT SERPL-MCNC: 0.63 MG/DL — SIGNIFICANT CHANGE UP (ref 0.5–1.3)
GAS PNL BLDV: SIGNIFICANT CHANGE UP
GAS PNL BLDV: SIGNIFICANT CHANGE UP
GLUCOSE SERPL-MCNC: 130 MG/DL — HIGH (ref 70–99)
GLUCOSE SERPL-MCNC: 161 MG/DL — HIGH (ref 70–99)
HCT VFR BLD CALC: 35 % — SIGNIFICANT CHANGE UP (ref 34.5–45)
HCT VFR BLD CALC: 35.3 % — SIGNIFICANT CHANGE UP (ref 34.5–45)
HGB BLD-MCNC: 12 G/DL — SIGNIFICANT CHANGE UP (ref 11.5–15.5)
HGB BLD-MCNC: 12.2 G/DL — SIGNIFICANT CHANGE UP (ref 11.5–15.5)
MAGNESIUM SERPL-MCNC: 2 MG/DL — SIGNIFICANT CHANGE UP (ref 1.6–2.6)
MAGNESIUM SERPL-MCNC: 2 MG/DL — SIGNIFICANT CHANGE UP (ref 1.6–2.6)
MCHC RBC-ENTMCNC: 31.4 PG — SIGNIFICANT CHANGE UP (ref 27–34)
MCHC RBC-ENTMCNC: 32.3 PG — SIGNIFICANT CHANGE UP (ref 27–34)
MCHC RBC-ENTMCNC: 34 GM/DL — SIGNIFICANT CHANGE UP (ref 32–36)
MCHC RBC-ENTMCNC: 34.9 GM/DL — SIGNIFICANT CHANGE UP (ref 32–36)
MCV RBC AUTO: 92.2 FL — SIGNIFICANT CHANGE UP (ref 80–100)
MCV RBC AUTO: 92.5 FL — SIGNIFICANT CHANGE UP (ref 80–100)
PHOSPHATE SERPL-MCNC: 3.2 MG/DL — SIGNIFICANT CHANGE UP (ref 2.5–4.5)
PHOSPHATE SERPL-MCNC: 3.2 MG/DL — SIGNIFICANT CHANGE UP (ref 2.5–4.5)
PLATELET # BLD AUTO: 199 K/UL — SIGNIFICANT CHANGE UP (ref 150–400)
PLATELET # BLD AUTO: 210 K/UL — SIGNIFICANT CHANGE UP (ref 150–400)
POTASSIUM SERPL-MCNC: 3.5 MMOL/L — SIGNIFICANT CHANGE UP (ref 3.5–5.3)
POTASSIUM SERPL-MCNC: 3.7 MMOL/L — SIGNIFICANT CHANGE UP (ref 3.5–5.3)
POTASSIUM SERPL-SCNC: 3.5 MMOL/L — SIGNIFICANT CHANGE UP (ref 3.5–5.3)
POTASSIUM SERPL-SCNC: 3.7 MMOL/L — SIGNIFICANT CHANGE UP (ref 3.5–5.3)
RBC # BLD: 3.79 M/UL — LOW (ref 3.8–5.2)
RBC # BLD: 3.83 M/UL — SIGNIFICANT CHANGE UP (ref 3.8–5.2)
RBC # FLD: 11.9 % — SIGNIFICANT CHANGE UP (ref 10.3–14.5)
RBC # FLD: 12.1 % — SIGNIFICANT CHANGE UP (ref 10.3–14.5)
SODIUM SERPL-SCNC: 137 MMOL/L — SIGNIFICANT CHANGE UP (ref 135–145)
SODIUM SERPL-SCNC: 138 MMOL/L — SIGNIFICANT CHANGE UP (ref 135–145)
TROPONIN T, HIGH SENSITIVITY RESULT: <6 NG/L — SIGNIFICANT CHANGE UP (ref 0–51)
WBC # BLD: 10 K/UL — SIGNIFICANT CHANGE UP (ref 3.8–10.5)
WBC # BLD: 10.4 K/UL — SIGNIFICANT CHANGE UP (ref 3.8–10.5)
WBC # FLD AUTO: 10 K/UL — SIGNIFICANT CHANGE UP (ref 3.8–10.5)
WBC # FLD AUTO: 10.4 K/UL — SIGNIFICANT CHANGE UP (ref 3.8–10.5)

## 2019-01-11 PROCEDURE — 71045 X-RAY EXAM CHEST 1 VIEW: CPT | Mod: 26

## 2019-01-11 RX ORDER — CALCIUM GLUCONATE 100 MG/ML
2 VIAL (ML) INTRAVENOUS ONCE
Qty: 0 | Refills: 0 | Status: COMPLETED | OUTPATIENT
Start: 2019-01-11 | End: 2019-01-11

## 2019-01-11 RX ORDER — CHLORHEXIDINE GLUCONATE 213 G/1000ML
1 SOLUTION TOPICAL
Qty: 0 | Refills: 0 | Status: DISCONTINUED | OUTPATIENT
Start: 2019-01-11 | End: 2019-01-12

## 2019-01-11 RX ORDER — SODIUM CHLORIDE 9 MG/ML
1000 INJECTION, SOLUTION INTRAVENOUS ONCE
Qty: 0 | Refills: 0 | Status: COMPLETED | OUTPATIENT
Start: 2019-01-11 | End: 2019-01-11

## 2019-01-11 RX ORDER — HYDROMORPHONE HYDROCHLORIDE 2 MG/ML
0.5 INJECTION INTRAMUSCULAR; INTRAVENOUS; SUBCUTANEOUS
Qty: 0 | Refills: 0 | Status: DISCONTINUED | OUTPATIENT
Start: 2019-01-11 | End: 2019-01-12

## 2019-01-11 RX ORDER — PHENYLEPHRINE HYDROCHLORIDE 10 MG/ML
0.5 INJECTION INTRAVENOUS
Qty: 40 | Refills: 0 | Status: DISCONTINUED | OUTPATIENT
Start: 2019-01-11 | End: 2019-01-12

## 2019-01-11 RX ORDER — SODIUM CHLORIDE 9 MG/ML
500 INJECTION, SOLUTION INTRAVENOUS ONCE
Qty: 0 | Refills: 0 | Status: COMPLETED | OUTPATIENT
Start: 2019-01-11 | End: 2019-01-11

## 2019-01-11 RX ORDER — KETOROLAC TROMETHAMINE 30 MG/ML
15 SYRINGE (ML) INJECTION EVERY 6 HOURS
Qty: 0 | Refills: 0 | Status: DISCONTINUED | OUTPATIENT
Start: 2019-01-11 | End: 2019-01-13

## 2019-01-11 RX ORDER — INFLUENZA VIRUS VACCINE 15; 15; 15; 15 UG/.5ML; UG/.5ML; UG/.5ML; UG/.5ML
0.5 SUSPENSION INTRAMUSCULAR ONCE
Qty: 0 | Refills: 0 | Status: DISCONTINUED | OUTPATIENT
Start: 2019-01-11 | End: 2019-01-16

## 2019-01-11 RX ORDER — SODIUM CHLORIDE 9 MG/ML
500 INJECTION, SOLUTION INTRAVENOUS ONCE
Qty: 0 | Refills: 0 | Status: COMPLETED | OUTPATIENT
Start: 2019-01-11 | End: 2019-01-10

## 2019-01-11 RX ORDER — CALCIUM GLUCONATE 100 MG/ML
2 VIAL (ML) INTRAVENOUS ONCE
Qty: 0 | Refills: 0 | Status: DISCONTINUED | OUTPATIENT
Start: 2019-01-11 | End: 2019-01-11

## 2019-01-11 RX ORDER — SODIUM CHLORIDE 9 MG/ML
1000 INJECTION, SOLUTION INTRAVENOUS
Qty: 0 | Refills: 0 | Status: DISCONTINUED | OUTPATIENT
Start: 2019-01-11 | End: 2019-01-12

## 2019-01-11 RX ORDER — SODIUM CHLORIDE 9 MG/ML
1000 INJECTION, SOLUTION INTRAVENOUS ONCE
Qty: 0 | Refills: 0 | Status: DISCONTINUED | OUTPATIENT
Start: 2019-01-11 | End: 2019-01-11

## 2019-01-11 RX ADMIN — Medication 1000 MILLIGRAM(S): at 15:42

## 2019-01-11 RX ADMIN — Medication 15 MILLIGRAM(S): at 23:21

## 2019-01-11 RX ADMIN — PIPERACILLIN AND TAZOBACTAM 25 GRAM(S): 4; .5 INJECTION, POWDER, LYOPHILIZED, FOR SOLUTION INTRAVENOUS at 21:02

## 2019-01-11 RX ADMIN — Medication 400 MILLIGRAM(S): at 15:51

## 2019-01-11 RX ADMIN — PIPERACILLIN AND TAZOBACTAM 25 GRAM(S): 4; .5 INJECTION, POWDER, LYOPHILIZED, FOR SOLUTION INTRAVENOUS at 05:04

## 2019-01-11 RX ADMIN — Medication 200 GRAM(S): at 08:38

## 2019-01-11 RX ADMIN — Medication 15 MILLIGRAM(S): at 13:20

## 2019-01-11 RX ADMIN — ENOXAPARIN SODIUM 40 MILLIGRAM(S): 100 INJECTION SUBCUTANEOUS at 13:20

## 2019-01-11 RX ADMIN — Medication 15 MILLIGRAM(S): at 18:54

## 2019-01-11 RX ADMIN — SODIUM CHLORIDE 1000 MILLILITER(S): 9 INJECTION, SOLUTION INTRAVENOUS at 00:50

## 2019-01-11 RX ADMIN — PIPERACILLIN AND TAZOBACTAM 25 GRAM(S): 4; .5 INJECTION, POWDER, LYOPHILIZED, FOR SOLUTION INTRAVENOUS at 15:51

## 2019-01-11 RX ADMIN — SODIUM CHLORIDE 1000 MILLILITER(S): 9 INJECTION, SOLUTION INTRAVENOUS at 06:52

## 2019-01-11 RX ADMIN — Medication 15 MILLIGRAM(S): at 19:04

## 2019-01-11 RX ADMIN — Medication 1000 MILLIGRAM(S): at 00:10

## 2019-01-11 RX ADMIN — SODIUM CHLORIDE 4000 MILLILITER(S): 9 INJECTION, SOLUTION INTRAVENOUS at 11:52

## 2019-01-11 RX ADMIN — Medication 15 MILLIGRAM(S): at 23:06

## 2019-01-11 RX ADMIN — Medication 1000 MILLIGRAM(S): at 18:45

## 2019-01-11 RX ADMIN — Medication 400 MILLIGRAM(S): at 11:13

## 2019-01-11 RX ADMIN — Medication 15 MILLIGRAM(S): at 15:42

## 2019-01-11 NOTE — CHART NOTE - NSCHARTNOTEFT_GEN_A_CORE
Patient continues to remain hypotensive with SBP dropping to 78. HR stable to the 80s. Anesthesia provided two 500 mL LR boluses with only a moderate improvement in BP.  BP continues to remain soft. Case discussed with senior resident, Dr. Belia Hackett. SICU consult placed with Dr. Leonard. SICU to assess patient in PACU.     Jordan Erickson MD   PGY-1   [id number] [facility] Patient continues to remain hypotensive with SBP dropping to 78. HR stable to the 80s. Anesthesia provided two 500 mL LR boluses with only a moderate improvement in BP.  BP continues to remain soft.  Case discussed with senior resident, Dr. Belia Hackett. SICU consult placed with Dr. Leonard. SICU to assess patient in PACU. Discussed with anesthesia. Advised team to wait on use of pressors to avoid damaging new anastomosis and await results of CBC.     Jordan Erickson MD   PGY-1   [id number] [facility] Patient continues to remain hypotensive with SBP dropping to 78. HR stable to the 80s. Anesthesia provided two 500 mL LR boluses with only a moderate improvement in BP.  BP continues to remain soft.  Case discussed with senior resident, Dr. Belia Hackett. SICU consult placed with Dr. Leonard. SICU to assess patient in PACU. Discussed with anesthesia. Advised anesthesia team to wait on use of pressors to avoid damaging new anastomosis and await results of CBC.     Jordan Erickson MD   PGY-1   [id number] [facility] Patient continues to remain hypotensive with SBP dropping to 78. HR stable to the 80s. Anesthesia provided two 500 mL LR boluses with only a moderate improvement in BP.  BP continues to remain soft.  Case discussed with senior resident, Dr. Belia Hackett. SICU consult placed with Dr. Leonard. SICU to assess patient in PACU. Discussed with anesthesia. Advised anesthesia team to wait on use of pressors to avoid damaging new anastomosis and await results of CBC.     Patient has received   1. 500 NS bolus from red surgery   2. 2x 500cc LR boluses from anesthesia    Plan:  - give additional 500 LR bolus  - stat 5am labs  - Patient to be remain in PACU overnight, will be monitored by SICU and [facility]       Jordan Erickson MD   PGY-1   [id number] [facility] Patient continues to remain hypotensive with SBP dropping to 78. HR stable to the 80s. Anesthesia provided two 500 cc LR boluses with only a moderate improvement in BP.  BP continues to remain soft.  Case discussed with senior resident, Dr. Belia Hackett. SICU consult placed with Dr. Leonard. SICU to assess patient in PACU. Discussed with anesthesia. Advised anesthesia team to wait on use of pressors to avoid damaging new anastomosis and await results of CBC.     Patient has received   1. 500 NS bolus from red surgery   2. 2x 500cc LR boluses from anesthesia    Plan:  - give additional 500cc LR bolus  - stat 5am labs  - Patient to be remain in PACU overnight, will be monitored by SICU and [facility]       Jordan Erickson MD   PGY-1   [id number] [facility]

## 2019-01-11 NOTE — PROGRESS NOTE ADULT - SUBJECTIVE AND OBJECTIVE BOX
General Surgery Progress Note    SUBJECTIVE:  The patient was seen and examined. Persistently hypotensive overnight with several boluses of LR given; failed to respond appropriately. Transferred to SICU for appropriate hemodynamic monitoring.   This am: denies n/v, cp, sob, abd pain, dizziness. Not yet ambulated. Blood-tinged urine in avitia.     OBJECTIVE:     ** VITAL SIGNS / I&O's **    Vital Signs Last 24 Hrs  T(C): 37.2 (11 Jan 2019 07:30), Max: 37.2 (11 Jan 2019 07:30)  T(F): 99 (11 Jan 2019 07:30), Max: 99 (11 Jan 2019 07:30)  HR: 88 (11 Jan 2019 09:00) (74 - 95)  BP: 95/53 (11 Jan 2019 09:00) (78/48 - 118/67)  BP(mean): 72 (11 Jan 2019 09:00) (56 - 86)  RR: 19 (11 Jan 2019 09:00) (12 - 45)  SpO2: 97% (11 Jan 2019 09:00) (95% - 100%)      10 Mauricio 2019 07:01  -  11 Jan 2019 07:00  --------------------------------------------------------  IN:    dextrose 5% + sodium chloride 0.45% with potassium chloride 20 mEq/L: 1000 mL    IV PiggyBack: 150 mL    Lactated Ringers IV Bolus: 1600 mL    Sodium Chloride 0.9% IV Bolus: 500 mL  Total IN: 3250 mL    OUT:    Indwelling Catheter - Urethral: 1055 mL  Total OUT: 1055 mL    Total NET: 2195 mL      11 Jan 2019 07:01  -  11 Jan 2019 10:04  --------------------------------------------------------  IN:    dextrose 5% + sodium chloride 0.45% with potassium chloride 20 mEq/L: 300 mL    phenylephrine   Infusion: 30 mL  Total IN: 330 mL    OUT:    Indwelling Catheter - Urethral: 400 mL  Total OUT: 400 mL    Total NET: -70 mL          ** PHYSICAL EXAM **    -- CONSTITUTIONAL: Alert, NAD  -- PULMONARY: non-labored respirations  -- ABDOMEN: soft, non-distended, appropriately tender; c/d/i incisions  -- : avitia in place w/ blood tinged urine  -- EXTR: jones, cortneyp    ** LABS **                          12.0   10.0  )-----------( 210      ( 11 Jan 2019 06:15 )             35.3     11 Jan 2019 06:15    138    |  107    |  7      ----------------------------<  130    3.7     |  20     |  0.59     Ca    7.9        11 Jan 2019 06:15  Phos  3.2       11 Jan 2019 06:15  Mg     2.0       11 Jan 2019 06:15    TPro  7.1    /  Alb  4.7    /  TBili  0.4    /  DBili  x      /  AST  50     /  ALT  12     /  AlkPhos  63     10 Mauricio 2019 11:40    PT/INR - ( 10 Mauricio 2019 11:40 )   PT: 11.1 sec;   INR: 0.97 ratio         PTT - ( 10 Mauricio 2019 11:40 )  PTT:22.9 sec  CAPILLARY BLOOD GLUCOSE            LIVER FUNCTIONS - ( 10 Mauricio 2019 11:40 )  Alb: 4.7 g/dL / Pro: 7.1 g/dL / ALK PHOS: 63 U/L / ALT: 12 U/L / AST: 50 U/L / GGT: x                 MEDICATIONS  (STANDING):  acetaminophen  IVPB .. 1000 milliGRAM(s) IV Intermittent once  acetaminophen  IVPB .. 1000 milliGRAM(s) IV Intermittent once  dextrose 5% + sodium chloride 0.45% with potassium chloride 20 mEq/L 1000 milliLiter(s) (100 mL/Hr) IV Continuous <Continuous>  enoxaparin Injectable 40 milliGRAM(s) SubCutaneous daily  ketorolac   Injectable 30 milliGRAM(s) IV Push every 6 hours  lactated ringers Bolus 1000 milliLiter(s) IV Bolus once  phenylephrine    Infusion 0.5 MICROgram(s)/kG/Min (12.587 mL/Hr) IV Continuous <Continuous>  piperacillin/tazobactam IVPB. 3.375 Gram(s) IV Intermittent every 8 hours    MEDICATIONS  (PRN):  HYDROmorphone  Injectable 0.5 milliGRAM(s) IV Push every 4 hours PRN Moderate Pain (4 - 6)  HYDROmorphone  Injectable 1 milliGRAM(s) IV Push every 4 hours PRN Severe Pain (7 - 10)  LORazepam   Injectable 0.5 milliGRAM(s) IV Push once PRN Nausea and/or Vomiting

## 2019-01-11 NOTE — CONSULT NOTE ADULT - SUBJECTIVE AND OBJECTIVE BOX
CONSULT RESULT - SURGICAL INTENSIVE CARE UNIT    HPI:  64 yo woman with a history of mitral valve prolapse, endometriosis, and diverticulitis who underwent preoperative planning colonoscopy today, then experienced diffuse lower abdominal pain with pneumoperitoneum, taken immediately to the OR where she was found to have a sigmoid perforation s/p LAR via lower midline incision. EBL was 50 cc, and she received 1.7L crystalloid during the case. Upon arrival in PACU, the patient became hypotensive to systolic BPs in the 70s -- she was given 500 cc bolus of LR with improvement of SBP to 90s. However, she then became hypotensive again to 70s treated with 500 cc boluses of crystalloid x2. The patient reports that she has baseline low blood pressures, and that these have been difficult to control after her previous surgeries. Per outpatient notes, the patient's BP runs 100s/70s at baseline. Patient also reports she takes aspirin as a home med due to a lower extremity vein stripping she underwent for varicose veins.    Upon evaluation in PACU, the patient reported moderate pain but had not been able to receive IV pain medication due to low BPs.  HR 79, BP 82/52, RR 18, Sat 96%.    PAST MEDICAL HISTORY:  Diverticulitis  Migraine headache  Mitral valve prolapse  Melanoma  Dyslipidemia  Endometriosis      PAST SURGICAL HISTORY:  S/P reconstruction procedure  S/P bladder repair  S/P appendectomy  S/P hysterectomy  S/P       MEDICATIONS:  acetaminophen  IVPB .. 1000 milliGRAM(s) IV Intermittent once  acetaminophen  IVPB .. 1000 milliGRAM(s) IV Intermittent once  acetaminophen  IVPB .. 1000 milliGRAM(s) IV Intermittent once  dextrose 5% + sodium chloride 0.45% with potassium chloride 20 mEq/L 1000 milliLiter(s) IV Continuous <Continuous>  enoxaparin Injectable 40 milliGRAM(s) SubCutaneous daily  HYDROmorphone  Injectable 0.5 milliGRAM(s) IV Push every 10 minutes PRN  HYDROmorphone  Injectable 1 milliGRAM(s) IV Push every 10 minutes PRN  HYDROmorphone  Injectable 0.5 milliGRAM(s) IV Push every 4 hours PRN  HYDROmorphone  Injectable 1 milliGRAM(s) IV Push every 4 hours PRN  ketorolac   Injectable 30 milliGRAM(s) IV Push every 6 hours  LORazepam   Injectable 0.5 milliGRAM(s) IV Push once PRN  piperacillin/tazobactam IVPB. 3.375 Gram(s) IV Intermittent every 8 hours    Home Medications:  aspirin 81 mg oral tablet: 1 tab(s) orally once a day (10 Mauricio 2019 12:06)  Zocor 20 mg oral tablet: 1 tab(s) orally once a day (at bedtime) (10 Maruicio 2019 12:06)      ALLERGIES:  No Known Allergies      VITALS & I/Os:  Vital Signs Last 24 Hrs  T(C): 36.2 (2019 00:00), Max: 36.6 (10 Mauricio 2019 11:05)  T(F): 97.2 (2019 00:00), Max: 97.9 (10 Mauricio 2019 11:05)  HR: 89 (2019 01:15) (78 - 95)  BP: 83/53 (2019 01:15) (78/48 - 118/67)  BP(mean): 64 (2019 01:15) (57 - 86)  RR: 16 (2019 01:15) (12 - 23)  SpO2: 96% (2019 01:15) (96% - 100%)    I&O's Summary    10 Mauricio 2019 07:01  -  2019 01:31  --------------------------------------------------------  IN: 1400 mL / OUT: 585 mL / NET: 815 mL        PHYSICAL EXAM:  GEN: NAD, resting quietly  PULM: symmetric chest rise bilaterally, no increased WOB  CV: regular rate, peripheral pulses intact  ABD: soft, ND, appropriately tender, lower midline laparotomy incision with dressing with serosanguinous staining  EXTR: no cyanosis or edema, moving all extremities    LABS:                        12.2   10.4  )-----------( 199      ( 2019 01:01 )             35.0     -11    137  |  105  |  8   ----------------------------<  161<H>  3.5   |  20<L>  |  0.63    Ca    7.6<L>      2019 01:01  Phos  3.2     01-11  Mg     2.0         TPro  7.1  /  Alb  4.7  /  TBili  0.4  /  DBili  x   /  AST  50<H>  /  ALT  12  /  AlkPhos  63  01-10    Lactate:  01-10 @ 11:40  1.3    PT/INR - ( 10 Mauricio 2019 11:40 )   PT: 11.1 sec;   INR: 0.97 ratio         PTT - ( 10 Mauricio 2019 11:40 )  PTT:22.9 sec      IMAGING:  no new imaging

## 2019-01-11 NOTE — CONSULT NOTE ADULT - ATTENDING COMMENTS
pt evaluated in AM round, continued to be re evaluated  S/p sigmoid colon perf during colonoscopy s/p LAR  Hypotensive post op combination of hypovolemia and SIRS/sepsis on vasopressor support,, titrate Enoc  IVF bolus, bedside point of care US  Change IVF to resuscitation fluid  Abx Zosyn     daughter kept updated  Total critical care time spent >45 mts reviewing the data, radiology, other consultants recommendation and formulating a plan of care. This time does not include performing procedure and teaching residents.  Case discussed in multidisciplinary round

## 2019-01-11 NOTE — CONSULT NOTE ADULT - ASSESSMENT
62 yo woman with a history of mitral valve prolapse, endometriosis, and diverticulitis with iatrogenic sigmoid injury today during colonoscopy s/p LAR.    NEURO: acute post-op pain control.  -IV tylenol 1g q6h around the clock  -dilaudid 0.5 mg q4h PRN pain    PULM: saturating well on room air.    CV: SBP 100s at baseline, no cardiac hx. On ASA after vein stripping procedure. SBPs 70s-90s in PACU s/p 2.5L boluses crystalloid.  -cont to monitor closely, will consider additional boluses vs. low-dose vasopressor support. Patient is awake, alert, mentating normally, producing good urine output.  -D5 1/2 NS + KCl @ 20 cc/hr    GI: s/p perforated viscus. NPO.    : BUN/Cr stable, producing adequate urine.  -cont avitia for strict I/O  -repeat labs in am    HEME: H/H stable, no indication for transfusion.  -lovenox for VTE PPx    ID: minimal intraperitoneal contamination due to previous bowel prep for colonoscopy today.  -zosyn 3.375 g q8h  -daily CBC    ENDO: no issues.    DISPO: PACU overnight for continued hemodynamic monitoring    Patient discussed with attending Dr. Iglesias

## 2019-01-11 NOTE — PROGRESS NOTE ADULT - ASSESSMENT
63F POD1 s/p LAR for perforarted viscus following colonoscopy    PLAN:  -continue care per SICU  -diet: npo, ivf  -monitor BP, VS  -pain control  -continue zosyn for colonic perforation  -dvt ppx: lovenox 40mg subQ daily  -oob/ambulate as tolerated

## 2019-01-12 LAB
ANION GAP SERPL CALC-SCNC: 14 MMOL/L — SIGNIFICANT CHANGE UP (ref 5–17)
BUN SERPL-MCNC: 8 MG/DL — SIGNIFICANT CHANGE UP (ref 7–23)
CALCIUM SERPL-MCNC: 8 MG/DL — LOW (ref 8.4–10.5)
CHLORIDE SERPL-SCNC: 107 MMOL/L — SIGNIFICANT CHANGE UP (ref 96–108)
CO2 SERPL-SCNC: 20 MMOL/L — LOW (ref 22–31)
CREAT SERPL-MCNC: 0.56 MG/DL — SIGNIFICANT CHANGE UP (ref 0.5–1.3)
GLUCOSE SERPL-MCNC: 96 MG/DL — SIGNIFICANT CHANGE UP (ref 70–99)
HCT VFR BLD CALC: 31.7 % — LOW (ref 34.5–45)
HGB BLD-MCNC: 11.1 G/DL — LOW (ref 11.5–15.5)
MAGNESIUM SERPL-MCNC: 1.9 MG/DL — SIGNIFICANT CHANGE UP (ref 1.6–2.6)
MCHC RBC-ENTMCNC: 32.5 PG — SIGNIFICANT CHANGE UP (ref 27–34)
MCHC RBC-ENTMCNC: 35 GM/DL — SIGNIFICANT CHANGE UP (ref 32–36)
MCV RBC AUTO: 92.8 FL — SIGNIFICANT CHANGE UP (ref 80–100)
PHOSPHATE SERPL-MCNC: 1.9 MG/DL — LOW (ref 2.5–4.5)
PLATELET # BLD AUTO: 161 K/UL — SIGNIFICANT CHANGE UP (ref 150–400)
POTASSIUM SERPL-MCNC: 3.5 MMOL/L — SIGNIFICANT CHANGE UP (ref 3.5–5.3)
POTASSIUM SERPL-SCNC: 3.5 MMOL/L — SIGNIFICANT CHANGE UP (ref 3.5–5.3)
RBC # BLD: 3.41 M/UL — LOW (ref 3.8–5.2)
RBC # FLD: 12.1 % — SIGNIFICANT CHANGE UP (ref 10.3–14.5)
SODIUM SERPL-SCNC: 141 MMOL/L — SIGNIFICANT CHANGE UP (ref 135–145)
WBC # BLD: 7.5 K/UL — SIGNIFICANT CHANGE UP (ref 3.8–10.5)
WBC # FLD AUTO: 7.5 K/UL — SIGNIFICANT CHANGE UP (ref 3.8–10.5)

## 2019-01-12 PROCEDURE — 99233 SBSQ HOSP IP/OBS HIGH 50: CPT

## 2019-01-12 RX ORDER — ONDANSETRON 8 MG/1
4 TABLET, FILM COATED ORAL ONCE
Qty: 0 | Refills: 0 | Status: COMPLETED | OUTPATIENT
Start: 2019-01-12 | End: 2019-01-12

## 2019-01-12 RX ORDER — OXYCODONE HYDROCHLORIDE 5 MG/1
10 TABLET ORAL EVERY 6 HOURS
Qty: 0 | Refills: 0 | Status: DISCONTINUED | OUTPATIENT
Start: 2019-01-12 | End: 2019-01-12

## 2019-01-12 RX ORDER — DOCUSATE SODIUM 100 MG
100 CAPSULE ORAL THREE TIMES A DAY
Qty: 0 | Refills: 0 | Status: DISCONTINUED | OUTPATIENT
Start: 2019-01-12 | End: 2019-01-16

## 2019-01-12 RX ORDER — OXYCODONE HYDROCHLORIDE 5 MG/1
5 TABLET ORAL EVERY 4 HOURS
Qty: 0 | Refills: 0 | Status: DISCONTINUED | OUTPATIENT
Start: 2019-01-12 | End: 2019-01-16

## 2019-01-12 RX ORDER — ACETAMINOPHEN 500 MG
975 TABLET ORAL EVERY 6 HOURS
Qty: 0 | Refills: 0 | Status: DISCONTINUED | OUTPATIENT
Start: 2019-01-12 | End: 2019-01-13

## 2019-01-12 RX ORDER — ONDANSETRON 8 MG/1
4 TABLET, FILM COATED ORAL ONCE
Qty: 0 | Refills: 0 | Status: DISCONTINUED | OUTPATIENT
Start: 2019-01-12 | End: 2019-01-12

## 2019-01-12 RX ORDER — MAGNESIUM SULFATE 500 MG/ML
2 VIAL (ML) INJECTION ONCE
Qty: 0 | Refills: 0 | Status: COMPLETED | OUTPATIENT
Start: 2019-01-12 | End: 2019-01-12

## 2019-01-12 RX ORDER — PIPERACILLIN AND TAZOBACTAM 4; .5 G/20ML; G/20ML
3.38 INJECTION, POWDER, LYOPHILIZED, FOR SOLUTION INTRAVENOUS EVERY 8 HOURS
Qty: 0 | Refills: 0 | Status: DISCONTINUED | OUTPATIENT
Start: 2019-01-12 | End: 2019-01-15

## 2019-01-12 RX ORDER — DEXTROSE MONOHYDRATE, SODIUM CHLORIDE, AND POTASSIUM CHLORIDE 50; .745; 4.5 G/1000ML; G/1000ML; G/1000ML
1000 INJECTION, SOLUTION INTRAVENOUS
Qty: 0 | Refills: 0 | Status: DISCONTINUED | OUTPATIENT
Start: 2019-01-12 | End: 2019-01-14

## 2019-01-12 RX ORDER — ACETAMINOPHEN 500 MG
1000 TABLET ORAL ONCE
Qty: 0 | Refills: 0 | Status: COMPLETED | OUTPATIENT
Start: 2019-01-12 | End: 2019-01-12

## 2019-01-12 RX ORDER — SENNA PLUS 8.6 MG/1
2 TABLET ORAL AT BEDTIME
Qty: 0 | Refills: 0 | Status: DISCONTINUED | OUTPATIENT
Start: 2019-01-12 | End: 2019-01-16

## 2019-01-12 RX ORDER — POTASSIUM CHLORIDE 20 MEQ
10 PACKET (EA) ORAL
Qty: 0 | Refills: 0 | Status: COMPLETED | OUTPATIENT
Start: 2019-01-12 | End: 2019-01-12

## 2019-01-12 RX ORDER — DEXTROSE MONOHYDRATE, SODIUM CHLORIDE, AND POTASSIUM CHLORIDE 50; .745; 4.5 G/1000ML; G/1000ML; G/1000ML
1000 INJECTION, SOLUTION INTRAVENOUS
Qty: 0 | Refills: 0 | Status: DISCONTINUED | OUTPATIENT
Start: 2019-01-12 | End: 2019-01-12

## 2019-01-12 RX ADMIN — DEXTROSE MONOHYDRATE, SODIUM CHLORIDE, AND POTASSIUM CHLORIDE 100 MILLILITER(S): 50; .745; 4.5 INJECTION, SOLUTION INTRAVENOUS at 06:08

## 2019-01-12 RX ADMIN — Medication 1000 MILLIGRAM(S): at 09:37

## 2019-01-12 RX ADMIN — Medication 100 MILLIGRAM(S): at 14:27

## 2019-01-12 RX ADMIN — HYDROMORPHONE HYDROCHLORIDE 0.5 MILLIGRAM(S): 2 INJECTION INTRAMUSCULAR; INTRAVENOUS; SUBCUTANEOUS at 08:12

## 2019-01-12 RX ADMIN — CHLORHEXIDINE GLUCONATE 1 APPLICATION(S): 213 SOLUTION TOPICAL at 05:09

## 2019-01-12 RX ADMIN — DEXTROSE MONOHYDRATE, SODIUM CHLORIDE, AND POTASSIUM CHLORIDE 75 MILLILITER(S): 50; .745; 4.5 INJECTION, SOLUTION INTRAVENOUS at 21:00

## 2019-01-12 RX ADMIN — ENOXAPARIN SODIUM 40 MILLIGRAM(S): 100 INJECTION SUBCUTANEOUS at 14:26

## 2019-01-12 RX ADMIN — Medication 250 MILLIMOLE(S): at 09:48

## 2019-01-12 RX ADMIN — PIPERACILLIN AND TAZOBACTAM 25 GRAM(S): 4; .5 INJECTION, POWDER, LYOPHILIZED, FOR SOLUTION INTRAVENOUS at 05:09

## 2019-01-12 RX ADMIN — Medication 15 MILLIGRAM(S): at 05:09

## 2019-01-12 RX ADMIN — Medication 50 GRAM(S): at 06:55

## 2019-01-12 RX ADMIN — Medication 62.5 MILLIMOLE(S): at 05:08

## 2019-01-12 RX ADMIN — Medication 15 MILLIGRAM(S): at 05:24

## 2019-01-12 RX ADMIN — Medication 100 MILLIEQUIVALENT(S): at 13:32

## 2019-01-12 RX ADMIN — SODIUM CHLORIDE 100 MILLILITER(S): 9 INJECTION, SOLUTION INTRAVENOUS at 05:10

## 2019-01-12 RX ADMIN — ONDANSETRON 4 MILLIGRAM(S): 8 TABLET, FILM COATED ORAL at 10:08

## 2019-01-12 RX ADMIN — PIPERACILLIN AND TAZOBACTAM 25 GRAM(S): 4; .5 INJECTION, POWDER, LYOPHILIZED, FOR SOLUTION INTRAVENOUS at 21:00

## 2019-01-12 RX ADMIN — HYDROMORPHONE HYDROCHLORIDE 0.5 MILLIGRAM(S): 2 INJECTION INTRAMUSCULAR; INTRAVENOUS; SUBCUTANEOUS at 09:38

## 2019-01-12 RX ADMIN — Medication 100 MILLIEQUIVALENT(S): at 14:26

## 2019-01-12 RX ADMIN — Medication 400 MILLIGRAM(S): at 08:11

## 2019-01-12 RX ADMIN — Medication 100 MILLIEQUIVALENT(S): at 11:11

## 2019-01-12 RX ADMIN — Medication 250 MILLIMOLE(S): at 14:26

## 2019-01-12 NOTE — PROGRESS NOTE ADULT - ASSESSMENT
63F POD2 s/p LAR for perforarted viscus following colonoscopy. recovering well.    PLAN:  -continue care per SICU  -diet: npo, ivf  -monitor BP, VS  -pain control  -continue zosyn for colonic perforation  -dvt ppx: lovenox 40mg subQ daily  -oob/ambulate as tolerated  -possible transfer to floor.

## 2019-01-12 NOTE — PROGRESS NOTE ADULT - SUBJECTIVE AND OBJECTIVE BOX
General Surgery Progress Note    SUBJECTIVE:  The patient was seen and examined. No acute events overnight. Pain well controlled. Denies n/v, cp, sob, abd pain. -flatus, -BM    OBJECTIVE:     ** VITAL SIGNS / I&O's **    Vital Signs Last 24 Hrs  T(C): 37.1 (12 Jan 2019 07:00), Max: 37.2 (11 Jan 2019 19:00)  T(F): 98.8 (12 Jan 2019 07:00), Max: 99 (11 Jan 2019 19:00)  HR: 84 (12 Jan 2019 08:00) (74 - 101)  BP: 98/55 (12 Jan 2019 08:00) (78/53 - 123/59)  BP(mean): 70 (12 Jan 2019 08:00) (62 - 82)  RR: 25 (12 Jan 2019 08:00) (14 - 34)  SpO2: 92% (12 Jan 2019 08:00) (92% - 99%)      11 Jan 2019 07:01  -  12 Jan 2019 07:00  --------------------------------------------------------  IN:    dextrose 5% + sodium chloride 0.45% with potassium chloride 20 mEq/L: 300 mL    dextrose 5% + sodium chloride 0.45% with potassium chloride 20 mEq/L: 200 mL    IV PiggyBack: 212.5 mL    Lactated Ringers IV Bolus: 1000 mL    lactated ringers.: 1800 mL    phenylephrine   Infusion: 42 mL    Solution: 75 mL  Total IN: 3629.5 mL    OUT:    Indwelling Catheter - Urethral: 995 mL    Voided: 100 mL  Total OUT: 1095 mL    Total NET: 2534.5 mL          ** PHYSICAL EXAM **    -- CONSTITUTIONAL: Alert, NAD  -- PULMONARY: non-labored respirations  -- ABDOMEN: soft, non-distended, appropriately tender, c/d/i incisions      ** LABS **                          11.1   7.5   )-----------( 161      ( 12 Jan 2019 03:24 )             31.7     12 Jan 2019 03:24    141    |  107    |  8      ----------------------------<  96     3.5     |  20     |  0.56     Ca    8.0        12 Jan 2019 03:24  Phos  1.9       12 Jan 2019 03:24  Mg     1.9       12 Jan 2019 03:24    TPro  7.1    /  Alb  4.7    /  TBili  0.4    /  DBili  x      /  AST  50     /  ALT  12     /  AlkPhos  63     10 Mauricio 2019 11:40    PT/INR - ( 10 Mauricio 2019 11:40 )   PT: 11.1 sec;   INR: 0.97 ratio         PTT - ( 10 Mauricio 2019 11:40 )  PTT:22.9 sec  CAPILLARY BLOOD GLUCOSE            LIVER FUNCTIONS - ( 10 Mauricio 2019 11:40 )  Alb: 4.7 g/dL / Pro: 7.1 g/dL / ALK PHOS: 63 U/L / ALT: 12 U/L / AST: 50 U/L / GGT: x                 MEDICATIONS  (STANDING):  chlorhexidine 4% Liquid 1 Application(s) Topical <User Schedule>  dextrose 5% + sodium chloride 0.45% with potassium chloride 20 mEq/L 1000 milliLiter(s) (100 mL/Hr) IV Continuous <Continuous>  enoxaparin Injectable 40 milliGRAM(s) SubCutaneous daily  influenza   Vaccine 0.5 milliLiter(s) IntraMuscular once  ketorolac   Injectable 15 milliGRAM(s) IV Push every 6 hours  piperacillin/tazobactam IVPB. 3.375 Gram(s) IV Intermittent every 8 hours  potassium chloride  10 mEq/100 mL IVPB 10 milliEquivalent(s) IV Intermittent every 1 hour  sodium phosphate IVPB 15 milliMole(s) IV Intermittent once  sodium phosphate IVPB 15 milliMole(s) IV Intermittent once    MEDICATIONS  (PRN):  HYDROmorphone  Injectable 0.5 milliGRAM(s) IV Push every 3 hours PRN Severe Pain (7 - 10)

## 2019-01-12 NOTE — PROGRESS NOTE ADULT - SUBJECTIVE AND OBJECTIVE BOX
HPI:  64 yo woman with a history of mitral valve prolapse, endometriosis, and diverticulitis who underwent preoperative planning colonoscopy today, then experienced diffuse lower abdominal pain with pneumoperitoneum, taken immediately to the OR where she was found to have a sigmoid perforation s/p LAR via lower midline incision. EBL was 50 cc, and she received 1.7L crystalloid during the case. Upon arrival in PACU, the patient became hypotensive to systolic BPs in the 70s -- she was given 500 cc bolus of LR with improvement of SBP to 90s. However, she then became hypotensive again to 70s treated with 500 cc boluses of crystalloid x2. The patient reports that she has baseline low blood pressures, and that these have been difficult to control after her previous surgeries. Per outpatient notes, the patient's BP runs 100s/70s at baseline. Patient also reports she takes aspirin as a home med due to a lower extremity vein stripping she underwent for varicose veins. HISTORY:  63 year old female with a past medical history of mitral valve prolapse, HLD, endometriosis, diverticulitis, and s/p varicose vein stripping on ASA who presented on 1/10 with diffuse lower abdominal pain after undergoing a pre-operative planning colonoscopy. An upright CXR revealed pneumoperitoneum so patient was taken emergently to the OR for an exploratory laparotomy. Patient was found to have a sigmoid perforation so a low anterior resection was performed. Case was uneventful and she was extubated successfully. She was transferred to PACU post-operatively but became hypotensive shortly afterwards with SBP in the 70s. Despite fluid resuscitation, patient remained hypotensive and was started on a phenylephrine gtt. She was admitted to SICU for hemodynamic monitoring.    24 HOUR EVENTS:  - Received 1 L bolus of LR for further fluid resuscitation. Was weaned off phenylephrine gtt by noon. Remained hemodynamically stable off vasopressor support overnight.  - Huffman discontinued and passed trial to void. HISTORY:  63 year old female with a past medical history of mitral valve prolapse, HLD, endometriosis, diverticulitis, and s/p varicose vein stripping on ASA who presented on 1/10 with diffuse lower abdominal pain after undergoing a pre-operative planning colonoscopy. An upright CXR revealed pneumoperitoneum so patient was taken emergently to the OR for an exploratory laparotomy. Patient was found to have a sigmoid perforation so a low anterior resection was performed. Case was uneventful and she was extubated successfully. She was transferred to PACU post-operatively but became hypotensive shortly afterwards with SBP in the 70s. Despite fluid resuscitation, patient remained hypotensive and was started on a phenylephrine gtt. She was admitted to SICU for hemodynamic monitoring.    24 HOUR EVENTS:  - Received 1 L bolus of LR for further fluid resuscitation. Was weaned off phenylephrine gtt by noon. Remained hemodynamically stable off vasopressor support overnight.  - Huffman discontinued and passed trial to void.    SUBJECTIVE/ROS:  [x] A ten-point review of systems was otherwise negative except as noted.  [ ] Due to altered mental status/intubation, subjective information were not able to be obtained from the patient. History was obtained, to the extent possible, from review of the chart and collateral sources of information.    NEURO  Exam: awake, alert, oriented x4, no acute distress, no focal deficits  Meds:  - HYDROmorphone  Injectable 0.5 milliGRAM(s) IV Push every 3 hours PRN Severe Pain (7 - 10)  - ketorolac   Injectable 15 milliGRAM(s) IV Push every 6 hours  [x] Adequacy of sedation and pain control has been assessed and adjusted    RESPIRATORY  RR: 23 (01-12-19 @ 05:00) (14 - 45)  SpO2: 95% (01-12-19 @ 05:00) (94% - 99%)  Exam: clear to auscultation bilaterally  Mechanical Ventilation: no  [N/A] Extubation Readiness Assessed  Meds: none    CARDIOVASCULAR  HR: 82 (01-12-19 @ 05:00) (74 - 101)  BP: 115/55 (01-12-19 @ 05:00) (78/53 - 123/59)  BP(mean): 79 (01-12-19 @ 05:00) (62 - 82)  Exam: regular rate and rhythm, S1S2  Cardiac Rhythm: sinus  Perfusion    [x]Adequate    [ ]Inadequate  Mentation   [x]Normal       [ ]Reduced  Extremities  [x]Warm         [ ]Cool  Volume Status [ ]Hypervolemic [x]Euvolemic [ ]Hypovolemic  Meds: none    GI/NUTRITION  Exam: soft, nondistended, edgar-incisional tenderness, incision dressing C/D/I  Diet: NPO  Meds: none    GENITOURINARY    141  |  107  |  8   ----------------------------<  96  3.5   |  20<L>  |  0.56    Ca    8.0<L>      12 Jan 2019 03:24  Phos  1.9  Mg     1.9    [ ] Huffman catheter, indication: N/A  Meds: dextrose 5% + sodium chloride 0.45% with potassium chloride 20 mEq/L infuse at 100 mL/hr    HEMATOLOGIC  Meds: enoxaparin Injectable 40 milliGRAM(s) SubCutaneous daily  [x] VTE Prophylaxis                        11.1   7.5   )-----------( 161      ( 12 Jan 2019 03:24 )             31.7     INFECTIOUS DISEASES  T(C): 36.9 (01-12-19 @ 03:00), Max: 37.2 (01-11-19 @ 07:30)  WBC Count:  - 7.5 K/uL (01-12 @ 03:24)  - 10.0 K/uL (01-11 @ 06:15)  Recent Cultures: none  Meds: piperacillin/tazobactam IVPB. 3.375 Gram(s) IV Intermittent every 8 hours    ENDOCRINE  Capillary Blood Glucose: none  Meds: none    ACCESS DEVICES:  [x] Peripheral IV  [ ] Central Venous Line	[ ] R	[ ] L	[ ] IJ	[ ] Fem	[ ] SC	Placed:   [ ] Arterial Line		[ ] R	[ ] L	[ ] Fem	[ ] Rad	[ ] Ax	Placed:   [ ] PICC:					[ ] Mediport  [ ] Urinary Catheter, Date Placed:   [x] Necessity of urinary, arterial, and venous catheters discussed    OTHER MEDICATIONS: chlorhexidine 4% Liquid 1 Application(s) Topical <User Schedule>    CODE STATUS: Full code.    IMAGING:

## 2019-01-12 NOTE — PROGRESS NOTE ADULT - ASSESSMENT
ASSESSMENT:  63 year old female presenting with colonic perforation after a colonoscopy s/p exploratory laparotomy and LAR complicated by post-op hypotension requiring vasopressor support.    PLAN: ASSESSMENT:  63 year old female presenting with colonic perforation after a colonoscopy s/p exploratory laparotomy and LAR complicated by post-op hypotension requiring vasopressor support.    PLAN:    Neuro: acute post-op pain  - Monitor mental status.  - Pain control as needed with IV acetaminophen, Toradol, and Dilaudid PRN.    Resp: atelectasis  - Monitor pulse oximeter.  - Out of bed to chair, ambulate as tolerated, and incentive spirometry to prevent atelectasis.    CV: post-op hypotension (resolved)  - Monitor vital signs.    GI: perforated rectal & colonic mass s/p exploratory laparotomy, en bloc resection of mass w/ hysterectomy due to invasion into the uterus, and end colostomy  - NPO. Awaiting bowel function. Diet as per primary team.    Renal: GENEVIEVE vs. CKD stage 2  - Monitor I&Os.  - Monitor electrolytes and replete as necessary.  - D5 1/2NS w/ 20 mEq KCl at 100 mL/hr while NPO.    Heme: no acute issues  - Monitor CBC and coags.  - Lovenox for VTE prophylaxis.    ID: colonic perforation  - Monitor WBC, temperature, and procalcitonin.  - Zosyn for empiric antibiotics in the setting of colonic perforation.    Endo: no acute issues  - Monitor glucose on BMP.    Disposition:  - Full code.  - Stable for transfer to floors.    Loida Arshad PA-C     [id number] ASSESSMENT:  63 year old female presenting with colonic perforation after a colonoscopy s/p exploratory laparotomy and LAR complicated by post-op hypotension requiring vasopressor support.    PLAN:    Neuro: acute post-op pain  - Monitor mental status.  - Pain control as needed with IV acetaminophen, Toradol, and Dilaudid PRN.    Resp: atelectasis  - Monitor pulse oximeter.  - Out of bed to chair, ambulate as tolerated, and incentive spirometry to prevent atelectasis.    CV: post-op hypotension (resolved)  - Monitor vital signs.    GI: colonic perforation s/p exploratory laparotomy & LAR  - NPO. Awaiting bowel function. Diet as per primary team.    Renal: no acute issues  - Monitor I&Os.  - Monitor electrolytes and replete as necessary.  - D5 1/2NS w/ 20 mEq KCl at 100 mL/hr while NPO.    Heme: no acute issues  - Monitor CBC and coags.  - Lovenox for VTE prophylaxis.    ID: colonic perforation  - Monitor WBC, temperature, and procalcitonin.  - Zosyn for empiric antibiotics in the setting of colonic perforation.    Endo: no acute issues  - Monitor glucose on BMP.    Disposition:  - Full code.  - Stable for transfer to floors.    Loida Arshad PA-C     [id number]

## 2019-01-13 LAB
ANION GAP SERPL CALC-SCNC: 10 MMOL/L — SIGNIFICANT CHANGE UP (ref 5–17)
BUN SERPL-MCNC: 7 MG/DL — SIGNIFICANT CHANGE UP (ref 7–23)
CALCIUM SERPL-MCNC: 8 MG/DL — LOW (ref 8.4–10.5)
CHLORIDE SERPL-SCNC: 105 MMOL/L — SIGNIFICANT CHANGE UP (ref 96–108)
CO2 SERPL-SCNC: 22 MMOL/L — SIGNIFICANT CHANGE UP (ref 22–31)
CREAT SERPL-MCNC: 0.48 MG/DL — LOW (ref 0.5–1.3)
GLUCOSE SERPL-MCNC: 127 MG/DL — HIGH (ref 70–99)
HCT VFR BLD CALC: 30.8 % — LOW (ref 34.5–45)
HGB BLD-MCNC: 10.5 G/DL — LOW (ref 11.5–15.5)
MAGNESIUM SERPL-MCNC: 2 MG/DL — SIGNIFICANT CHANGE UP (ref 1.6–2.6)
MCHC RBC-ENTMCNC: 30.9 PG — SIGNIFICANT CHANGE UP (ref 27–34)
MCHC RBC-ENTMCNC: 34.1 GM/DL — SIGNIFICANT CHANGE UP (ref 32–36)
MCV RBC AUTO: 90.6 FL — SIGNIFICANT CHANGE UP (ref 80–100)
PHOSPHATE SERPL-MCNC: 1.9 MG/DL — LOW (ref 2.5–4.5)
PLATELET # BLD AUTO: 173 K/UL — SIGNIFICANT CHANGE UP (ref 150–400)
POTASSIUM SERPL-MCNC: 3.6 MMOL/L — SIGNIFICANT CHANGE UP (ref 3.5–5.3)
POTASSIUM SERPL-SCNC: 3.6 MMOL/L — SIGNIFICANT CHANGE UP (ref 3.5–5.3)
RBC # BLD: 3.4 M/UL — LOW (ref 3.8–5.2)
RBC # FLD: 13.7 % — SIGNIFICANT CHANGE UP (ref 10.3–14.5)
SODIUM SERPL-SCNC: 137 MMOL/L — SIGNIFICANT CHANGE UP (ref 135–145)
WBC # BLD: 7.33 K/UL — SIGNIFICANT CHANGE UP (ref 3.8–10.5)
WBC # FLD AUTO: 7.33 K/UL — SIGNIFICANT CHANGE UP (ref 3.8–10.5)

## 2019-01-13 RX ORDER — IBUPROFEN 200 MG
600 TABLET ORAL EVERY 6 HOURS
Qty: 0 | Refills: 0 | Status: DISCONTINUED | OUTPATIENT
Start: 2019-01-13 | End: 2019-01-16

## 2019-01-13 RX ORDER — KETOROLAC TROMETHAMINE 30 MG/ML
15 SYRINGE (ML) INJECTION EVERY 6 HOURS
Qty: 0 | Refills: 0 | Status: DISCONTINUED | OUTPATIENT
Start: 2019-01-13 | End: 2019-01-13

## 2019-01-13 RX ORDER — ONDANSETRON 8 MG/1
4 TABLET, FILM COATED ORAL ONCE
Qty: 0 | Refills: 0 | Status: COMPLETED | OUTPATIENT
Start: 2019-01-13 | End: 2019-01-13

## 2019-01-13 RX ORDER — ACETAMINOPHEN 500 MG
650 TABLET ORAL EVERY 6 HOURS
Qty: 0 | Refills: 0 | Status: DISCONTINUED | OUTPATIENT
Start: 2019-01-13 | End: 2019-01-16

## 2019-01-13 RX ORDER — OXYCODONE HYDROCHLORIDE 5 MG/1
10 TABLET ORAL EVERY 6 HOURS
Qty: 0 | Refills: 0 | Status: DISCONTINUED | OUTPATIENT
Start: 2019-01-13 | End: 2019-01-16

## 2019-01-13 RX ORDER — POTASSIUM PHOSPHATE, MONOBASIC POTASSIUM PHOSPHATE, DIBASIC 236; 224 MG/ML; MG/ML
15 INJECTION, SOLUTION INTRAVENOUS ONCE
Qty: 0 | Refills: 0 | Status: COMPLETED | OUTPATIENT
Start: 2019-01-13 | End: 2019-01-13

## 2019-01-13 RX ORDER — SODIUM,POTASSIUM PHOSPHATES 278-250MG
2 POWDER IN PACKET (EA) ORAL
Qty: 0 | Refills: 0 | Status: COMPLETED | OUTPATIENT
Start: 2019-01-13 | End: 2019-01-13

## 2019-01-13 RX ADMIN — POTASSIUM PHOSPHATE, MONOBASIC POTASSIUM PHOSPHATE, DIBASIC 62.5 MILLIMOLE(S): 236; 224 INJECTION, SOLUTION INTRAVENOUS at 14:34

## 2019-01-13 RX ADMIN — PIPERACILLIN AND TAZOBACTAM 25 GRAM(S): 4; .5 INJECTION, POWDER, LYOPHILIZED, FOR SOLUTION INTRAVENOUS at 22:02

## 2019-01-13 RX ADMIN — DEXTROSE MONOHYDRATE, SODIUM CHLORIDE, AND POTASSIUM CHLORIDE 75 MILLILITER(S): 50; .745; 4.5 INJECTION, SOLUTION INTRAVENOUS at 18:44

## 2019-01-13 RX ADMIN — Medication 600 MILLIGRAM(S): at 22:03

## 2019-01-13 RX ADMIN — ONDANSETRON 4 MILLIGRAM(S): 8 TABLET, FILM COATED ORAL at 06:35

## 2019-01-13 RX ADMIN — Medication 650 MILLIGRAM(S): at 14:13

## 2019-01-13 RX ADMIN — PIPERACILLIN AND TAZOBACTAM 25 GRAM(S): 4; .5 INJECTION, POWDER, LYOPHILIZED, FOR SOLUTION INTRAVENOUS at 06:34

## 2019-01-13 RX ADMIN — Medication 15 MILLIGRAM(S): at 06:33

## 2019-01-13 RX ADMIN — Medication 15 MILLIGRAM(S): at 16:19

## 2019-01-13 RX ADMIN — Medication 600 MILLIGRAM(S): at 22:33

## 2019-01-13 RX ADMIN — Medication 650 MILLIGRAM(S): at 18:44

## 2019-01-13 RX ADMIN — PIPERACILLIN AND TAZOBACTAM 25 GRAM(S): 4; .5 INJECTION, POWDER, LYOPHILIZED, FOR SOLUTION INTRAVENOUS at 14:34

## 2019-01-13 RX ADMIN — Medication 15 MILLIGRAM(S): at 14:38

## 2019-01-13 RX ADMIN — Medication 2 TABLET(S): at 22:02

## 2019-01-13 RX ADMIN — Medication 650 MILLIGRAM(S): at 11:58

## 2019-01-13 RX ADMIN — Medication 15 MILLIGRAM(S): at 07:03

## 2019-01-13 RX ADMIN — Medication 2 TABLET(S): at 17:53

## 2019-01-13 RX ADMIN — ONDANSETRON 4 MILLIGRAM(S): 8 TABLET, FILM COATED ORAL at 00:29

## 2019-01-13 RX ADMIN — ENOXAPARIN SODIUM 40 MILLIGRAM(S): 100 INJECTION SUBCUTANEOUS at 11:58

## 2019-01-13 NOTE — PHYSICAL THERAPY INITIAL EVALUATION ADULT - ADDITIONAL COMMENTS
Pt lives alone in an apartment building with elevator access, reports she will be staying with her daughter upon D/C with ~4 steps to negotiate with HR. Was independent with all ADLs and ambulation without an AD

## 2019-01-13 NOTE — CHART NOTE - NSCHARTNOTEFT_GEN_A_CORE
RED SURGERY FLOOR TRANSFER NOTE    SUBJECTIVE:   63y Female transferred to floor from SICU without any acute events. Upon arrival on the floor, the patient's primary complaint is of nausea. She is also having some abdominal pain but is concerned that her pain medications have been causing her nausea. She denies any difficulty breathing, chest pain, or lightheadedness. Was changed to CLD but hasn't had too much to drink.    OBJECTIVE:    T(C): 37.2 (01-12-19 @ 23:00), Max: 37.2 (01-12-19 @ 23:00)  HR: 87 (01-12-19 @ 23:00) (73 - 101)  BP: 107/68 (01-12-19 @ 23:00) (97/55 - 126/59)  RR: 18 (01-12-19 @ 23:00) (16 - 34)  SpO2: 95% (01-12-19 @ 23:00) (88% - 95%)    I&O's Summary    11 Jan 2019 07:01  -  12 Jan 2019 07:00  --------------------------------------------------------  IN: 3629.5 mL / OUT: 1095 mL / NET: 2534.5 mL    12 Jan 2019 07:01  -  13 Jan 2019 00:16  --------------------------------------------------------  IN: 1325 mL / OUT: 0 mL / NET: 1325 mL                       11.1   7.5   )-----------( 161      ( 12 Jan 2019 03:24 )             31.7     01-12    141  |  107  |  8   ----------------------------<  96  3.5   |  20<L>  |  0.56    Ca    8.0<L>      12 Jan 2019 03:24  Phos  1.9     01-12  Mg     1.9     01-12    MEDICATIONS  (STANDING):  dextrose 5% + sodium chloride 0.45% with potassium chloride 20 mEq/L 1000 milliLiter(s) (75 mL/Hr) IV Continuous <Continuous>  docusate sodium 100 milliGRAM(s) Oral three times a day  enoxaparin Injectable 40 milliGRAM(s) SubCutaneous daily  influenza   Vaccine 0.5 milliLiter(s) IntraMuscular once  ketorolac   Injectable 15 milliGRAM(s) IV Push every 6 hours  ondansetron Injectable 4 milliGRAM(s) IV Push once  piperacillin/tazobactam IVPB. 3.375 Gram(s) IV Intermittent every 8 hours  senna 2 Tablet(s) Oral at bedtime    MEDICATIONS  (PRN):  acetaminophen   Tablet .. 975 milliGRAM(s) Oral every 6 hours PRN Mild Pain (1 - 3)  oxyCODONE    IR 5 milliGRAM(s) Oral every 4 hours PRN Moderate Pain (4 - 6)    PHYSICAL EXAM:  General: Appears mildly uncomfortable while moving from one bed to the next  Pulm: Non-labored breathing on RA   Abd: Soft, mild diffuse tenderness, no rebound tenderness or guarding, midline dressing in place is clean and dry.    ASSESSMENT  63F POD2 s/p LAR for perforarted viscus following colonoscopy now transferred to the floor from SICU    PLAN:  - Diet: CLD  - monitor BP, VS  - Zofran prn for nausea  - pain control with toradol, tylenol, and oxycodone  - continue zosyn for colonic perforation  - dvt ppx: lovenox 40mg subQ daily

## 2019-01-13 NOTE — PHYSICAL THERAPY INITIAL EVALUATION ADULT - ACTIVE RANGE OF MOTION EXAMINATION, REHAB EVAL
Right LE Active ROM was WFL (within functional limits)/Left LE Active ROM was WFL (within functional limits)/Left UE Active ROM was WFL (within functional limits)/Right UE Active ROM was WFL (within functional limits)

## 2019-01-13 NOTE — PHYSICAL THERAPY INITIAL EVALUATION ADULT - TRANSFER TRAINING, PT EVAL
GOAL: Pt will perform ALL transfers with _ assist, w/use of appropriate assistive device as needed, in 4 weeks.

## 2019-01-13 NOTE — PROGRESS NOTE ADULT - ASSESSMENT
63F POD3 s/p LAR for perforarted viscus following colonoscopy. recovering well.    PLAN:  -diet: clears, monitor GI function  -monitor BP, VS  -pain control  -continue zosyn for colonic perforation  -dvt ppx: lovenox 40mg subQ daily  -oob/ambulate as tolerated

## 2019-01-13 NOTE — PHYSICAL THERAPY INITIAL EVALUATION ADULT - PERTINENT HX OF CURRENT PROBLEM, REHAB EVAL
62 yo F with PMH hysterectomy for endometriosis, appendectomy, bladder suspension, MV prolapse, was planning for surgery for diverticulitis in January underwent a pre-operative colonoscopy on 1/10/19 and had diffuse lower abdominal pain after the procedure. Pt now s/p Low anterior resection for Perforated viscus after colonoscopy on 1/10.

## 2019-01-13 NOTE — PROGRESS NOTE ADULT - SUBJECTIVE AND OBJECTIVE BOX
General Surgery Progress Note    SUBJECTIVE:  The patient was seen and examined. No acute events overnight.     OBJECTIVE:     ** VITAL SIGNS / I&O's **    Vital Signs Last 24 Hrs  T(C): 37 (13 Jan 2019 00:00), Max: 37.2 (12 Jan 2019 23:00)  T(F): 98.6 (13 Jan 2019 00:00), Max: 99 (12 Jan 2019 23:00)  HR: 86 (13 Jan 2019 00:00) (73 - 87)  BP: 112/59 (13 Jan 2019 00:00) (97/55 - 126/59)  BP(mean): 82 (12 Jan 2019 23:00) (70 - 85)  RR: 18 (13 Jan 2019 00:00) (16 - 26)  SpO2: 94% (13 Jan 2019 00:00) (88% - 95%)      12 Jan 2019 07:01  -  13 Jan 2019 07:00  --------------------------------------------------------  IN:    dextrose 5% + sodium chloride 0.45% with potassium chloride 20 mEq/L: 200 mL    dextrose 5% + sodium chloride 0.45% with potassium chloride 20 mEq/L: 1050 mL    IV PiggyBack: 75 mL  Total IN: 1325 mL    OUT:  Total OUT: 0 mL    Total NET: 1325 mL          ** PHYSICAL EXAM **    -- CONSTITUTIONAL: Alert, NAD  -- PULMONARY: non-labored respirations  -- ABDOMEN: soft, non-distended, non-tender  -- NEURO: A&Ox3    ** LABS **                          11.1   7.5   )-----------( 161      ( 12 Jan 2019 03:24 )             31.7     12 Jan 2019 03:24    141    |  107    |  8      ----------------------------<  96     3.5     |  20     |  0.56     Ca    8.0        12 Jan 2019 03:24  Phos  1.9       12 Jan 2019 03:24  Mg     1.9       12 Jan 2019 03:24        CAPILLARY BLOOD GLUCOSE                    MEDICATIONS  (STANDING):  dextrose 5% + sodium chloride 0.45% with potassium chloride 20 mEq/L 1000 milliLiter(s) (75 mL/Hr) IV Continuous <Continuous>  docusate sodium 100 milliGRAM(s) Oral three times a day  enoxaparin Injectable 40 milliGRAM(s) SubCutaneous daily  influenza   Vaccine 0.5 milliLiter(s) IntraMuscular once  piperacillin/tazobactam IVPB. 3.375 Gram(s) IV Intermittent every 8 hours  senna 2 Tablet(s) Oral at bedtime    MEDICATIONS  (PRN):  acetaminophen   Tablet .. 975 milliGRAM(s) Oral every 6 hours PRN Mild Pain (1 - 3)  oxyCODONE    IR 5 milliGRAM(s) Oral every 4 hours PRN Moderate Pain (4 - 6) General Surgery Progress Note    SUBJECTIVE:  The patient was seen and examined. No acute events overnight. Complaining of some nausea. Denies emesis.  +flatus, +BMs. Pain controlled. Diet advanced to clears.    OBJECTIVE:     ** VITAL SIGNS / I&O's **    Vital Signs Last 24 Hrs  T(C): 37 (13 Jan 2019 00:00), Max: 37.2 (12 Jan 2019 23:00)  T(F): 98.6 (13 Jan 2019 00:00), Max: 99 (12 Jan 2019 23:00)  HR: 86 (13 Jan 2019 00:00) (73 - 87)  BP: 112/59 (13 Jan 2019 00:00) (97/55 - 126/59)  BP(mean): 82 (12 Jan 2019 23:00) (70 - 85)  RR: 18 (13 Jan 2019 00:00) (16 - 26)  SpO2: 94% (13 Jan 2019 00:00) (88% - 95%)      12 Jan 2019 07:01  -  13 Jan 2019 07:00  --------------------------------------------------------  IN:    dextrose 5% + sodium chloride 0.45% with potassium chloride 20 mEq/L: 200 mL    dextrose 5% + sodium chloride 0.45% with potassium chloride 20 mEq/L: 1050 mL    IV PiggyBack: 75 mL  Total IN: 1325 mL    OUT:  Total OUT: 0 mL    Total NET: 1325 mL          ** PHYSICAL EXAM **    -- CONSTITUTIONAL: Alert, NAD  -- PULMONARY: non-labored respirations  -- ABDOMEN: soft, non-distended, non-tender; c/d/i incisions      ** LABS **                          11.1   7.5   )-----------( 161      ( 12 Jan 2019 03:24 )             31.7     12 Jan 2019 03:24    141    |  107    |  8      ----------------------------<  96     3.5     |  20     |  0.56     Ca    8.0        12 Jan 2019 03:24  Phos  1.9       12 Jan 2019 03:24  Mg     1.9       12 Jan 2019 03:24        CAPILLARY BLOOD GLUCOSE                    MEDICATIONS  (STANDING):  dextrose 5% + sodium chloride 0.45% with potassium chloride 20 mEq/L 1000 milliLiter(s) (75 mL/Hr) IV Continuous <Continuous>  docusate sodium 100 milliGRAM(s) Oral three times a day  enoxaparin Injectable 40 milliGRAM(s) SubCutaneous daily  influenza   Vaccine 0.5 milliLiter(s) IntraMuscular once  piperacillin/tazobactam IVPB. 3.375 Gram(s) IV Intermittent every 8 hours  senna 2 Tablet(s) Oral at bedtime    MEDICATIONS  (PRN):  acetaminophen   Tablet .. 975 milliGRAM(s) Oral every 6 hours PRN Mild Pain (1 - 3)  oxyCODONE    IR 5 milliGRAM(s) Oral every 4 hours PRN Moderate Pain (4 - 6)

## 2019-01-14 LAB
ANION GAP SERPL CALC-SCNC: 10 MMOL/L — SIGNIFICANT CHANGE UP (ref 5–17)
BUN SERPL-MCNC: 6 MG/DL — LOW (ref 7–23)
CALCIUM SERPL-MCNC: 8.5 MG/DL — SIGNIFICANT CHANGE UP (ref 8.4–10.5)
CHLORIDE SERPL-SCNC: 109 MMOL/L — HIGH (ref 96–108)
CO2 SERPL-SCNC: 23 MMOL/L — SIGNIFICANT CHANGE UP (ref 22–31)
CREAT SERPL-MCNC: 0.59 MG/DL — SIGNIFICANT CHANGE UP (ref 0.5–1.3)
GLUCOSE SERPL-MCNC: 89 MG/DL — SIGNIFICANT CHANGE UP (ref 70–99)
HCT VFR BLD CALC: 33.1 % — LOW (ref 34.5–45)
HGB BLD-MCNC: 11.2 G/DL — LOW (ref 11.5–15.5)
MAGNESIUM SERPL-MCNC: 2 MG/DL — SIGNIFICANT CHANGE UP (ref 1.6–2.6)
MCHC RBC-ENTMCNC: 31.4 PG — SIGNIFICANT CHANGE UP (ref 27–34)
MCHC RBC-ENTMCNC: 33.8 GM/DL — SIGNIFICANT CHANGE UP (ref 32–36)
MCV RBC AUTO: 92.7 FL — SIGNIFICANT CHANGE UP (ref 80–100)
PHOSPHATE SERPL-MCNC: 3.8 MG/DL — SIGNIFICANT CHANGE UP (ref 2.5–4.5)
PLATELET # BLD AUTO: 210 K/UL — SIGNIFICANT CHANGE UP (ref 150–400)
POTASSIUM SERPL-MCNC: 3.8 MMOL/L — SIGNIFICANT CHANGE UP (ref 3.5–5.3)
POTASSIUM SERPL-SCNC: 3.8 MMOL/L — SIGNIFICANT CHANGE UP (ref 3.5–5.3)
RBC # BLD: 3.57 M/UL — LOW (ref 3.8–5.2)
RBC # FLD: 13.6 % — SIGNIFICANT CHANGE UP (ref 10.3–14.5)
SODIUM SERPL-SCNC: 142 MMOL/L — SIGNIFICANT CHANGE UP (ref 135–145)
WBC # BLD: 5.24 K/UL — SIGNIFICANT CHANGE UP (ref 3.8–10.5)
WBC # FLD AUTO: 5.24 K/UL — SIGNIFICANT CHANGE UP (ref 3.8–10.5)

## 2019-01-14 RX ORDER — POTASSIUM CHLORIDE 20 MEQ
20 PACKET (EA) ORAL ONCE
Qty: 0 | Refills: 0 | Status: COMPLETED | OUTPATIENT
Start: 2019-01-14 | End: 2019-01-14

## 2019-01-14 RX ORDER — SIMVASTATIN 20 MG/1
20 TABLET, FILM COATED ORAL AT BEDTIME
Qty: 0 | Refills: 0 | Status: DISCONTINUED | OUTPATIENT
Start: 2019-01-14 | End: 2019-01-16

## 2019-01-14 RX ORDER — METHOCARBAMOL 500 MG/1
500 TABLET, FILM COATED ORAL EVERY 8 HOURS
Qty: 0 | Refills: 0 | Status: DISCONTINUED | OUTPATIENT
Start: 2019-01-14 | End: 2019-01-16

## 2019-01-14 RX ORDER — ONDANSETRON 8 MG/1
4 TABLET, FILM COATED ORAL ONCE
Qty: 0 | Refills: 0 | Status: COMPLETED | OUTPATIENT
Start: 2019-01-14 | End: 2019-01-14

## 2019-01-14 RX ADMIN — Medication 600 MILLIGRAM(S): at 22:34

## 2019-01-14 RX ADMIN — PIPERACILLIN AND TAZOBACTAM 25 GRAM(S): 4; .5 INJECTION, POWDER, LYOPHILIZED, FOR SOLUTION INTRAVENOUS at 05:18

## 2019-01-14 RX ADMIN — Medication 650 MILLIGRAM(S): at 14:07

## 2019-01-14 RX ADMIN — PIPERACILLIN AND TAZOBACTAM 25 GRAM(S): 4; .5 INJECTION, POWDER, LYOPHILIZED, FOR SOLUTION INTRAVENOUS at 22:34

## 2019-01-14 RX ADMIN — Medication 650 MILLIGRAM(S): at 12:57

## 2019-01-14 RX ADMIN — Medication 600 MILLIGRAM(S): at 23:00

## 2019-01-14 RX ADMIN — ENOXAPARIN SODIUM 40 MILLIGRAM(S): 100 INJECTION SUBCUTANEOUS at 12:57

## 2019-01-14 RX ADMIN — ONDANSETRON 4 MILLIGRAM(S): 8 TABLET, FILM COATED ORAL at 11:44

## 2019-01-14 RX ADMIN — Medication 20 MILLIEQUIVALENT(S): at 12:57

## 2019-01-14 RX ADMIN — METHOCARBAMOL 500 MILLIGRAM(S): 500 TABLET, FILM COATED ORAL at 22:34

## 2019-01-14 RX ADMIN — PIPERACILLIN AND TAZOBACTAM 25 GRAM(S): 4; .5 INJECTION, POWDER, LYOPHILIZED, FOR SOLUTION INTRAVENOUS at 14:42

## 2019-01-14 RX ADMIN — Medication 650 MILLIGRAM(S): at 05:18

## 2019-01-14 RX ADMIN — ONDANSETRON 4 MILLIGRAM(S): 8 TABLET, FILM COATED ORAL at 17:54

## 2019-01-14 NOTE — PROGRESS NOTE ADULT - ASSESSMENT
63 s/p 1/10 LAR for perforated viscus following colonoscopy, overall recovering appropriately.    PLAN:  - Pain control as needed.   - LRD as tolerated.   - Continue zosyn for colonic perforation  - Dvt ppx: lovenox 40mg subQ daily  - Oob/ambulate as tolerated  - Dispo: possible home tomorrow, will f/u CM regarding midline packing.     Red Team Surgery Pager #[phone]

## 2019-01-14 NOTE — PROGRESS NOTE ADULT - SUBJECTIVE AND OBJECTIVE BOX
Surgery Progress Note     Subjective/24hour Events:   Patient seen and examined.   No acute events overnight, no reported dark BM.   Remains afebrile, hemodynamically stable.   Pain well controlled.   Tolerating diet without N/V.   Minimally OOB.    Vital Signs:  Vital Signs Last 24 Hrs  T(C): 37.4 (14 Jan 2019 05:26), Max: 37.4 (14 Jan 2019 05:26)  T(F): 99.3 (14 Jan 2019 05:26), Max: 99.3 (14 Jan 2019 05:26)  HR: 75 (14 Jan 2019 05:26) (67 - 86)  BP: 116/75 (14 Jan 2019 05:26) (99/57 - 120/75)  BP(mean): --  RR: 18 (14 Jan 2019 05:26) (18 - 20)  SpO2: 91% (14 Jan 2019 05:26) (91% - 97%)    CAPILLARY BLOOD GLUCOSE          I&O's Detail    13 Jan 2019 07:01  -  14 Jan 2019 07:00  --------------------------------------------------------  IN:    dextrose 5% + sodium chloride 0.45% with potassium chloride 20 mEq/L: 900 mL    IV PiggyBack: 250 mL    Oral Fluid: 800 mL    Solution: 300 mL  Total IN: 2250 mL    OUT:    Stool: 3 mL    Voided: 750 mL  Total OUT: 753 mL    Total NET: 1497 mL          MEDICATIONS  (STANDING):  acetaminophen   Tablet .. 650 milliGRAM(s) Oral every 6 hours  docusate sodium 100 milliGRAM(s) Oral three times a day  enoxaparin Injectable 40 milliGRAM(s) SubCutaneous daily  influenza   Vaccine 0.5 milliLiter(s) IntraMuscular once  piperacillin/tazobactam IVPB. 3.375 Gram(s) IV Intermittent every 8 hours  senna 2 Tablet(s) Oral at bedtime  simvastatin 20 milliGRAM(s) Oral at bedtime    MEDICATIONS  (PRN):  ibuprofen  Tablet. 600 milliGRAM(s) Oral every 6 hours PRN Mild Pain (1 - 3)  oxyCODONE    IR 10 milliGRAM(s) Oral every 6 hours PRN Severe Pain (7 - 10)  oxyCODONE    IR 5 milliGRAM(s) Oral every 4 hours PRN Moderate Pain (4 - 6)      Physical Exam:  Gen: NAD, laying comfortably in bed, converses easily.   Lungs: Non labored breathing.   Ab: Soft, nontender, nondistended. Midline wound repacked, clean, no erythema, no purulence.   Ext: Moves all 4 spontaneously.     Labs:    01-14    142  |  109<H>  |  6<L>  ----------------------------<  89  3.8   |  23  |  0.59    Ca    8.5      14 Jan 2019 07:08  Phos  3.8     01-14  Mg     2.0     01-14                              10.5   7.33  )-----------( 173      ( 13 Jan 2019 08:29 )             30.8

## 2019-01-14 NOTE — DIETITIAN INITIAL EVALUATION ADULT. - OTHER INFO
64 yo F with PMHx dyslipidemia, endometriosis, melanoma, migraines, mitral valve prolapse. PSH hysterectomy, appendectomy, bladder suspension. P/w Mitral valve prolapse, endometriosis, planned for surgery for diverticulitis in January, s/p pre-op colonoscopy 1/10/19. Pt with colonic perforation following colonoscopy. POD4 s/p low anterior resection 1/10. Pt reports minimal appetite and only wanting to eat small ammounts, consuming <50% meals since diet advanced. Pt declines oral nutrition supplements, food preferences obtained. Reports nausea controlled at this time (noted Zofran). Pt denies D/C, +BM, loose on 1/13 per EMR. Noted bowel regimen. Edema: 2+, generalized. NKFA. RD to f/u per protocol. 62 yo F with PMHx dyslipidemia, endometriosis, melanoma, migraines, mitral valve prolapse. PSH hysterectomy, appendectomy, bladder suspension. P/w Mitral valve prolapse, endometriosis, planned for surgery for diverticulitis in January, s/p pre-op colonoscopy 1/10/19. Pt with colonic perforation following colonoscopy. POD4 s/p low anterior resection 1/10. Pt reports minimal appetite and only wanting to eat small ammounts, consuming <50% meals since diet advanced. Pt declines oral nutrition supplements, discussed available foods. Reports nausea controlled at this time (noted Zofran). Pt denies D/C, +BM, loose on 1/13 per EMR. Noted bowel regimen. Edema: 2+, generalized. NKFA. RD to f/u per protocol.

## 2019-01-14 NOTE — DIETITIAN INITIAL EVALUATION ADULT. - NS AS NUTRI INTERV ED CONTENT
Provided pt with diet education about fiber restricted, low residue diet. Discussed importance of small, frequent meals, low fiber food sources, and food preferences within diet regimen.

## 2019-01-14 NOTE — DIETITIAN INITIAL EVALUATION ADULT. - ADHERENCE
Diet recall: Breakfast- Cream of wheat or toasted bread; Lunch-Salad, chicken cutlet; Dinner- Mashed potato, lean beef, pork beef. Takes multivitamin, Vitamin D3, E, Biotin, Grazyna, Vitamin C, and B12 daily. Pt reports following clear liquid and low residue diets when she has flair-ups.

## 2019-01-15 ENCOUNTER — TRANSCRIPTION ENCOUNTER (OUTPATIENT)
Age: 64
End: 2019-01-15

## 2019-01-15 LAB
ANION GAP SERPL CALC-SCNC: 13 MMOL/L — SIGNIFICANT CHANGE UP (ref 5–17)
BUN SERPL-MCNC: 6 MG/DL — LOW (ref 7–23)
CALCIUM SERPL-MCNC: 8.4 MG/DL — SIGNIFICANT CHANGE UP (ref 8.4–10.5)
CHLORIDE SERPL-SCNC: 105 MMOL/L — SIGNIFICANT CHANGE UP (ref 96–108)
CO2 SERPL-SCNC: 23 MMOL/L — SIGNIFICANT CHANGE UP (ref 22–31)
CREAT SERPL-MCNC: 0.63 MG/DL — SIGNIFICANT CHANGE UP (ref 0.5–1.3)
GLUCOSE SERPL-MCNC: 81 MG/DL — SIGNIFICANT CHANGE UP (ref 70–99)
HCT VFR BLD CALC: 33.4 % — LOW (ref 34.5–45)
HGB BLD-MCNC: 11.1 G/DL — LOW (ref 11.5–15.5)
MAGNESIUM SERPL-MCNC: 2 MG/DL — SIGNIFICANT CHANGE UP (ref 1.6–2.6)
MCHC RBC-ENTMCNC: 30.8 PG — SIGNIFICANT CHANGE UP (ref 27–34)
MCHC RBC-ENTMCNC: 33.2 GM/DL — SIGNIFICANT CHANGE UP (ref 32–36)
MCV RBC AUTO: 92.8 FL — SIGNIFICANT CHANGE UP (ref 80–100)
PHOSPHATE SERPL-MCNC: 3.6 MG/DL — SIGNIFICANT CHANGE UP (ref 2.5–4.5)
PLATELET # BLD AUTO: 251 K/UL — SIGNIFICANT CHANGE UP (ref 150–400)
POTASSIUM SERPL-MCNC: 4 MMOL/L — SIGNIFICANT CHANGE UP (ref 3.5–5.3)
POTASSIUM SERPL-SCNC: 4 MMOL/L — SIGNIFICANT CHANGE UP (ref 3.5–5.3)
RBC # BLD: 3.6 M/UL — LOW (ref 3.8–5.2)
RBC # FLD: 13.6 % — SIGNIFICANT CHANGE UP (ref 10.3–14.5)
SODIUM SERPL-SCNC: 141 MMOL/L — SIGNIFICANT CHANGE UP (ref 135–145)
WBC # BLD: 5.41 K/UL — SIGNIFICANT CHANGE UP (ref 3.8–10.5)
WBC # FLD AUTO: 5.41 K/UL — SIGNIFICANT CHANGE UP (ref 3.8–10.5)

## 2019-01-15 RX ORDER — ONDANSETRON 8 MG/1
4 TABLET, FILM COATED ORAL ONCE
Qty: 0 | Refills: 0 | Status: COMPLETED | OUTPATIENT
Start: 2019-01-15 | End: 2019-01-15

## 2019-01-15 RX ADMIN — ENOXAPARIN SODIUM 40 MILLIGRAM(S): 100 INJECTION SUBCUTANEOUS at 12:50

## 2019-01-15 RX ADMIN — Medication 600 MILLIGRAM(S): at 12:49

## 2019-01-15 RX ADMIN — Medication 100 MILLIGRAM(S): at 21:12

## 2019-01-15 RX ADMIN — Medication 600 MILLIGRAM(S): at 18:43

## 2019-01-15 RX ADMIN — Medication 600 MILLIGRAM(S): at 18:13

## 2019-01-15 RX ADMIN — Medication 1 TABLET(S): at 21:12

## 2019-01-15 RX ADMIN — PIPERACILLIN AND TAZOBACTAM 25 GRAM(S): 4; .5 INJECTION, POWDER, LYOPHILIZED, FOR SOLUTION INTRAVENOUS at 06:02

## 2019-01-15 RX ADMIN — Medication 600 MILLIGRAM(S): at 06:02

## 2019-01-15 RX ADMIN — ONDANSETRON 4 MILLIGRAM(S): 8 TABLET, FILM COATED ORAL at 18:11

## 2019-01-15 RX ADMIN — METHOCARBAMOL 500 MILLIGRAM(S): 500 TABLET, FILM COATED ORAL at 06:02

## 2019-01-15 RX ADMIN — Medication 100 MILLIGRAM(S): at 12:49

## 2019-01-15 RX ADMIN — METHOCARBAMOL 500 MILLIGRAM(S): 500 TABLET, FILM COATED ORAL at 21:12

## 2019-01-15 RX ADMIN — Medication 600 MILLIGRAM(S): at 13:00

## 2019-01-15 RX ADMIN — METHOCARBAMOL 500 MILLIGRAM(S): 500 TABLET, FILM COATED ORAL at 12:49

## 2019-01-15 NOTE — DISCHARGE NOTE ADULT - PLAN OF CARE
Recovery from surgery WOUND CARE: Keep incision clean and dry  BATHING: Please do not submerge wound underwater. You may shower and/or sponge bathe.  ACTIVITY: No heavy lifting or straining. Otherwise, you may return to your usual level of physical activity. If you are taking narcotic pain medication (such as Percocet), do NOT drive a car, operate machinery or make important decisions.  DIET: Low fiber diet  NOTIFY YOUR SURGEON IF: You have any bleeding that does not stop, any pus draining from your wound, any fever (over 100.4 F) or chills, persistent nausea/vomiting, persistent diarrhea, or if your pain is not controlled on your discharge pain medications.  FOLLOW-UP:  1. Follow-up with Dr. Ko within 1-2 weeks of discharge.  Please call office for appointment  2. Please follow up with your primary care physician in one week regarding your hospitalization.

## 2019-01-15 NOTE — PROGRESS NOTE ADULT - ASSESSMENT
63 s/p 1/10 LAR for perforated viscus following colonoscopy, overall recovering appropriately.    PLAN:  - Pain control as needed.   - LRD as tolerated.   - F/u GI fxn.   - Continue zosyn for colonic perforation.  - Dvt ppx: lovenox 40mg subQ daily  - Oob/ambulate as tolerated.   - Dispo: possible home today or tomorrow, will f/u CM regarding midline packing.     Red Team Surgery Pager #[phone]

## 2019-01-15 NOTE — DISCHARGE NOTE ADULT - PATIENT PORTAL LINK FT
You can access the [facility] Patient Portal, offered by [facility], by registering with the following website: [email or web address] [place] [facility]

## 2019-01-15 NOTE — PROGRESS NOTE ADULT - SUBJECTIVE AND OBJECTIVE BOX
Surgery Progress Note     Subjective/24hour Events:   Patient seen and examined.   Yesterday during day with minimal PO intake. Had nausea, improved with zofran. Reported no gas.   Evening reported +/+, small BM. Nausea improved.   No acute events overnight.   Pain well controlled.     Vital Signs:  Vital Signs Last 24 Hrs  T(C): 37.6 (14 Jan 2019 22:28), Max: 37.6 (14 Jan 2019 22:28)  T(F): 99.6 (14 Jan 2019 22:28), Max: 99.6 (14 Jan 2019 22:28)  HR: 80 (14 Jan 2019 22:28) (69 - 80)  BP: 133/68 (14 Jan 2019 22:28) (99/57 - 133/68)  BP(mean): --  RR: 18 (14 Jan 2019 22:28) (18 - 18)  SpO2: 93% (14 Jan 2019 22:28) (91% - 97%)    CAPILLARY BLOOD GLUCOSE          I&O's Detail    13 Jan 2019 07:01  -  14 Jan 2019 07:00  --------------------------------------------------------  IN:    dextrose 5% + sodium chloride 0.45% with potassium chloride 20 mEq/L: 900 mL    IV PiggyBack: 250 mL    Oral Fluid: 800 mL    Solution: 300 mL  Total IN: 2250 mL    OUT:    Stool: 3 mL    Voided: 750 mL  Total OUT: 753 mL    Total NET: 1497 mL      14 Jan 2019 07:01  -  15 Mauricio 2019 00:38  --------------------------------------------------------  IN:    IV PiggyBack: 100 mL    Oral Fluid: 580 mL  Total IN: 680 mL    OUT:    Voided: 750 mL  Total OUT: 750 mL    Total NET: -70 mL          MEDICATIONS  (STANDING):  acetaminophen   Tablet .. 650 milliGRAM(s) Oral every 6 hours  docusate sodium 100 milliGRAM(s) Oral three times a day  enoxaparin Injectable 40 milliGRAM(s) SubCutaneous daily  influenza   Vaccine 0.5 milliLiter(s) IntraMuscular once  methocarbamol 500 milliGRAM(s) Oral every 8 hours  piperacillin/tazobactam IVPB. 3.375 Gram(s) IV Intermittent every 8 hours  senna 2 Tablet(s) Oral at bedtime  simvastatin 20 milliGRAM(s) Oral at bedtime    MEDICATIONS  (PRN):  ibuprofen  Tablet. 600 milliGRAM(s) Oral every 6 hours PRN Mild Pain (1 - 3)  oxyCODONE    IR 10 milliGRAM(s) Oral every 6 hours PRN Severe Pain (7 - 10)  oxyCODONE    IR 5 milliGRAM(s) Oral every 4 hours PRN Moderate Pain (4 - 6)      Physical Exam:  Gen: NAD, laying comfortably in bed, converses easily.   Lungs: Non labored breathing.   Ab: Soft, nontender, nondistended. Midline wound repacked, clean, no erythema, no purulence.   Ext: Moves all 4 spontaneously.     Labs:    01-14    142  |  109<H>  |  6<L>  ----------------------------<  89  3.8   |  23  |  0.59    Ca    8.5      14 Jan 2019 07:08  Phos  3.8     01-14  Mg     2.0     01-14                              11.2   5.24  )-----------( 210      ( 14 Jan 2019 08:16 )             33.1

## 2019-01-15 NOTE — DISCHARGE NOTE ADULT - NS AS ACTIVITY OBS
no driving while taking narcotic pain medications/No Heavy lifting/straining/Do not drive or operate machinery

## 2019-01-15 NOTE — DISCHARGE NOTE ADULT - HOSPITAL COURSE
63-year-old woman with MV prolapse, planning for surgery for diverticulitis in January underwent a pre-operative colonoscopy on 1/10/19 and had diffuse lower abdominal pain after the procedure.   On ASA 81mg daily. PSH includes hysterectomy for endometriosis, appendectomy, bladder suspension.  She was sent to the ER where AXR showed free air c/w colon perforation.  She was taken emergently to the OR and underwent low anterior resection.  She tolerated the procedure well, was extubated and sent to PACU in stable condition.  Postoperatively she was hypotensive and required pressor support, she was sent to SICU for monitoring and fluid resuscitation.    hemodynamically stable and was transferred to a surgical floor. The patient's pain was controlled by IV pain medications and then by PO pain medications. The patient was advanced from clears to regular diet and tolerated it well.  The patient is ambulating, voiding, tolerating a regular diet, and pain is controlled with oral pain medications.  Patient is stable for discharge home. 63-year-old woman with MV prolapse, planning for surgery for diverticulitis in January underwent a pre-operative colonoscopy on 1/10/19 and had diffuse lower abdominal pain after the procedure.   On ASA 81mg daily. PSH includes hysterectomy for endometriosis, appendectomy, bladder suspension.  She was sent to the ER where AXR showed free air c/w colon perforation.  She was taken emergently to the OR and underwent low anterior resection.  She tolerated the procedure well, was extubated and sent to PACU in stable condition.  Postoperatively she was hypotensive and required pressor support, she was sent to SICU for monitoring and fluid resuscitation.  She remained hemodynamically stable and was transferred to a surgical floor.  Her pain was controlled by IV pain medications and then by PO pain medications.  She was advanced from clears to low fiber diet and tolerated it well.  She was evaluated by PT, who determined she had no PT needs.  She is ambulating, voiding, tolerating a low fiber diet, and pain is controlled with oral pain medications.  Patient is stable for discharge home and will follow-up with Dr. Lane within 1-2 weeks.

## 2019-01-15 NOTE — PROGRESS NOTE ADULT - ATTENDING COMMENTS
+/+, [place] LRD, pain controlled,  -d/c home in morning
Pt states pain is well controlled and passed flatus.  -NPO  -IVF  -ABX  -Appreciate SICU care
Hemodynamically stable off pressure  Start clears, decrease IVF rate  Continue abx for 7 days, cultures neg  Pain well controlled  Transfer

## 2019-01-15 NOTE — DISCHARGE NOTE ADULT - CARE PROVIDER_API CALL
Andrzej Ko), ColonRectal Surgery; Surgery  [address] [facility]  [address]  [place] [address]  Phone: [phone]  Fax: [phone]

## 2019-01-15 NOTE — DISCHARGE NOTE ADULT - MEDICATION SUMMARY - MEDICATIONS TO TAKE
I will START or STAY ON the medications listed below when I get home from the hospital:    rolling walker  -- 1 unit(s)   -- Indication: For walking    acetaminophen 325 mg oral tablet  -- 2 tab(s) by mouth every 6 hours  -- Indication: For mild pain    oxyCODONE 5 mg oral tablet  -- 1 tab(s) by mouth every 4 hours, As needed, Moderate Pain (4 - 6) MDD:6  -- Indication: For moderate pain    oxyCODONE 10 mg oral tablet  -- 1 tab(s) by mouth every 6 hours, As needed, Severe Pain (7 - 10)  -- Indication: For severe pain    aspirin 81 mg oral tablet  -- 1 tab(s) by mouth once a day  -- Indication: For heart    Zocor 20 mg oral tablet  -- 1 tab(s) by mouth once a day (at bedtime)  -- Indication: For cholesterol    amoxicillin-clavulanate 875 mg-125 mg oral tablet  -- 1 tab(s) by mouth 2 times a day  -- Indication: For Perforation I will START or STAY ON the medications listed below when I get home from the hospital:    rolling walker  -- 1 unit(s)   -- Indication: For walking    acetaminophen 325 mg oral tablet  -- 2 tab(s) by mouth every 6 hours  -- Indication: For mild pain    oxyCODONE 5 mg oral tablet  -- 1 tab(s) by mouth every 4 hours, As needed, Moderate Pain (4 - 6) MDD:6  -- Indication: For moderate pain    oxyCODONE 10 mg oral tablet  -- 1 tab(s) by mouth every 6 hours, As needed, Severe Pain (7 - 10)  -- Indication: For severe pain    aspirin 81 mg oral tablet  -- 1 tab(s) by mouth once a day  -- Indication: For heart    Zocor 20 mg oral tablet  -- 1 tab(s) by mouth once a day (at bedtime)  -- Indication: For cholesterol    amoxicillin-clavulanate 875 mg-125 mg oral tablet  -- 1 tab(s) by mouth 2 times a day  -- Indication: For Perforated viscus

## 2019-01-15 NOTE — DISCHARGE NOTE ADULT - CARE PLAN
Principal Discharge DX:	Perforated viscus  Goal:	Recovery from surgery  Assessment and plan of treatment:	WOUND CARE: Keep incision clean and dry  BATHING: Please do not submerge wound underwater. You may shower and/or sponge bathe.  ACTIVITY: No heavy lifting or straining. Otherwise, you may return to your usual level of physical activity. If you are taking narcotic pain medication (such as Percocet), do NOT drive a car, operate machinery or make important decisions.  DIET: Low fiber diet  NOTIFY YOUR SURGEON IF: You have any bleeding that does not stop, any pus draining from your wound, any fever (over 100.4 F) or chills, persistent nausea/vomiting, persistent diarrhea, or if your pain is not controlled on your discharge pain medications.  FOLLOW-UP:  1. Follow-up with Dr. Ko within 1-2 weeks of discharge.  Please call office for appointment  2. Please follow up with your primary care physician in one week regarding your hospitalization.

## 2019-01-16 VITALS
TEMPERATURE: 99 F | SYSTOLIC BLOOD PRESSURE: 109 MMHG | HEART RATE: 80 BPM | DIASTOLIC BLOOD PRESSURE: 60 MMHG | RESPIRATION RATE: 18 BRPM | OXYGEN SATURATION: 95 %

## 2019-01-16 PROCEDURE — 88307 TISSUE EXAM BY PATHOLOGIST: CPT

## 2019-01-16 PROCEDURE — 85730 THROMBOPLASTIN TIME PARTIAL: CPT

## 2019-01-16 PROCEDURE — 86850 RBC ANTIBODY SCREEN: CPT

## 2019-01-16 PROCEDURE — 84295 ASSAY OF SERUM SODIUM: CPT

## 2019-01-16 PROCEDURE — 82565 ASSAY OF CREATININE: CPT

## 2019-01-16 PROCEDURE — 84484 ASSAY OF TROPONIN QUANT: CPT

## 2019-01-16 PROCEDURE — 96375 TX/PRO/DX INJ NEW DRUG ADDON: CPT

## 2019-01-16 PROCEDURE — 80053 COMPREHEN METABOLIC PANEL: CPT

## 2019-01-16 PROCEDURE — 83735 ASSAY OF MAGNESIUM: CPT

## 2019-01-16 PROCEDURE — 85027 COMPLETE CBC AUTOMATED: CPT

## 2019-01-16 PROCEDURE — 82435 ASSAY OF BLOOD CHLORIDE: CPT

## 2019-01-16 PROCEDURE — 82330 ASSAY OF CALCIUM: CPT

## 2019-01-16 PROCEDURE — 84132 ASSAY OF SERUM POTASSIUM: CPT

## 2019-01-16 PROCEDURE — 96374 THER/PROPH/DIAG INJ IV PUSH: CPT

## 2019-01-16 PROCEDURE — 82803 BLOOD GASES ANY COMBINATION: CPT

## 2019-01-16 PROCEDURE — 71045 X-RAY EXAM CHEST 1 VIEW: CPT

## 2019-01-16 PROCEDURE — 99285 EMERGENCY DEPT VISIT HI MDM: CPT | Mod: 25

## 2019-01-16 PROCEDURE — 83605 ASSAY OF LACTIC ACID: CPT

## 2019-01-16 PROCEDURE — 80048 BASIC METABOLIC PNL TOTAL CA: CPT

## 2019-01-16 PROCEDURE — 86901 BLOOD TYPING SEROLOGIC RH(D): CPT

## 2019-01-16 PROCEDURE — 82947 ASSAY GLUCOSE BLOOD QUANT: CPT

## 2019-01-16 PROCEDURE — 97161 PT EVAL LOW COMPLEX 20 MIN: CPT

## 2019-01-16 PROCEDURE — 86900 BLOOD TYPING SEROLOGIC ABO: CPT

## 2019-01-16 PROCEDURE — 84100 ASSAY OF PHOSPHORUS: CPT

## 2019-01-16 PROCEDURE — 85014 HEMATOCRIT: CPT

## 2019-01-16 PROCEDURE — 85610 PROTHROMBIN TIME: CPT

## 2019-01-16 RX ORDER — ACETAMINOPHEN 500 MG
2 TABLET ORAL
Qty: 0 | Refills: 0 | COMMUNITY
Start: 2019-01-16

## 2019-01-16 RX ORDER — OXYCODONE HYDROCHLORIDE 5 MG/1
1 TABLET ORAL
Qty: 15 | Refills: 0
Start: 2019-01-16

## 2019-01-16 RX ORDER — OXYCODONE HYDROCHLORIDE 5 MG/1
1 TABLET ORAL
Qty: 0 | Refills: 0 | DISCHARGE
Start: 2019-01-16

## 2019-01-16 RX ORDER — ONDANSETRON 8 MG/1
4 TABLET, FILM COATED ORAL ONCE
Qty: 0 | Refills: 0 | Status: COMPLETED | OUTPATIENT
Start: 2019-01-16 | End: 2019-01-16

## 2019-01-16 RX ADMIN — ONDANSETRON 4 MILLIGRAM(S): 8 TABLET, FILM COATED ORAL at 09:54

## 2019-01-16 RX ADMIN — Medication 650 MILLIGRAM(S): at 05:51

## 2019-01-16 RX ADMIN — METHOCARBAMOL 500 MILLIGRAM(S): 500 TABLET, FILM COATED ORAL at 05:21

## 2019-01-16 RX ADMIN — Medication 650 MILLIGRAM(S): at 05:21

## 2019-01-16 RX ADMIN — Medication 1 TABLET(S): at 09:54

## 2019-01-16 RX ADMIN — Medication 650 MILLIGRAM(S): at 00:24

## 2019-01-16 RX ADMIN — Medication 100 MILLIGRAM(S): at 05:21

## 2019-01-16 RX ADMIN — Medication 650 MILLIGRAM(S): at 00:55

## 2019-01-16 NOTE — PROGRESS NOTE ADULT - ASSESSMENT
63 s/p 1/10 LAR for perforated viscus following colonoscopy, overall recovering appropriately    - Pain control as needed  - LRD  - augmentin for 2 days  - Dvt ppx: lovenox 40mg subQ daily  - Oob/ambulate as tolerated.   - Dispo: possible home today

## 2019-01-16 NOTE — PROGRESS NOTE ADULT - SUBJECTIVE AND OBJECTIVE BOX
Surgery Progress Note      Subjective: Patient seen and examined. No acute events overnight. Nausea and abdominal distention improving. + flatus, + BM    T(C): 37.1 (01-16-19 @ 04:44), Max: 37.4 (01-15-19 @ 22:02)  HR: 76 (01-16-19 @ 04:44) (63 - 87)  BP: 100/57 (01-16-19 @ 04:44) (98/57 - 120/72)  RR: 18 (01-16-19 @ 04:44) (18 - 18)  SpO2: 94% (01-16-19 @ 04:44) (94% - 98%)      01-15-19 @ 07:01  -  01-16-19 @ 07:00  --------------------------------------------------------  IN: 840 mL / OUT: 0 mL / NET: 840 mL        Physical Exam:   General: NAD  Abdomen: soft, not tender, incision c/d/i with guaze and tape    Labs:                          11.1   5.41  )-----------( 251      ( 15 Mauricio 2019 09:25 )             33.4     01-15    141  |  105  |  6<L>  ----------------------------<  81  4.0   |  23  |  0.63    Ca    8.4      15 Mauricio 2019 07:36  Phos  3.6     01-15  Mg     2.0     01-15        Medications:     acetaminophen   Tablet .. 650 milliGRAM(s) Oral every 6 hours  amoxicillin  875 milliGRAM(s)/clavulanate 1 Tablet(s) Oral two times a day  docusate sodium 100 milliGRAM(s) Oral three times a day  enoxaparin Injectable 40 milliGRAM(s) SubCutaneous daily  ibuprofen  Tablet. 600 milliGRAM(s) Oral every 6 hours PRN  influenza   Vaccine 0.5 milliLiter(s) IntraMuscular once  methocarbamol 500 milliGRAM(s) Oral every 8 hours  oxyCODONE    IR 10 milliGRAM(s) Oral every 6 hours PRN  oxyCODONE    IR 5 milliGRAM(s) Oral every 4 hours PRN  senna 2 Tablet(s) Oral at bedtime  simvastatin 20 milliGRAM(s) Oral at bedtime      Radiographs: No new imaging

## 2019-01-16 NOTE — PROGRESS NOTE ADULT - REASON FOR ADMISSION
pain after colonoscopy

## 2019-01-22 LAB — SURGICAL PATHOLOGY STUDY: SIGNIFICANT CHANGE UP

## 2019-01-31 ENCOUNTER — APPOINTMENT (OUTPATIENT)
Dept: COLORECTAL SURGERY | Facility: HOSPITAL | Age: 64
End: 2019-01-31

## 2019-01-31 ENCOUNTER — APPOINTMENT (OUTPATIENT)
Dept: COLORECTAL SURGERY | Facility: CLINIC | Age: 64
End: 2019-01-31

## 2019-02-14 ENCOUNTER — TRANSCRIPTION ENCOUNTER (OUTPATIENT)
Age: 64
End: 2019-02-14

## 2019-02-21 ENCOUNTER — OUTPATIENT (OUTPATIENT)
Dept: OUTPATIENT SERVICES | Facility: HOSPITAL | Age: 64
LOS: 1 days | End: 2019-02-21
Payer: COMMERCIAL

## 2019-02-21 ENCOUNTER — APPOINTMENT (OUTPATIENT)
Dept: COLORECTAL SURGERY | Facility: CLINIC | Age: 64
End: 2019-02-21
Payer: COMMERCIAL

## 2019-02-21 ENCOUNTER — APPOINTMENT (OUTPATIENT)
Dept: CT IMAGING | Facility: CLINIC | Age: 64
End: 2019-02-21
Payer: COMMERCIAL

## 2019-02-21 DIAGNOSIS — K57.32 DIVERTICULITIS OF LARGE INTESTINE W/OUT PERFORATION OR ABSCESS W/OUT BLEEDING: ICD-10-CM

## 2019-02-21 DIAGNOSIS — Z00.8 ENCOUNTER FOR OTHER GENERAL EXAMINATION: ICD-10-CM

## 2019-02-21 PROCEDURE — 74177 CT ABD & PELVIS W/CONTRAST: CPT | Mod: 26

## 2019-02-21 PROCEDURE — 99024 POSTOP FOLLOW-UP VISIT: CPT

## 2019-02-21 PROCEDURE — 82565 ASSAY OF CREATININE: CPT

## 2019-02-21 PROCEDURE — 74177 CT ABD & PELVIS W/CONTRAST: CPT

## 2019-02-21 RX ORDER — NITROGLYCERIN 4 MG/G
0.4 OINTMENT RECTAL
Qty: 2 | Refills: 1 | Status: ACTIVE | COMMUNITY
Start: 2019-02-21 | End: 1900-01-01

## 2019-03-04 ENCOUNTER — TRANSCRIPTION ENCOUNTER (OUTPATIENT)
Age: 64
End: 2019-03-04

## 2019-03-04 PROBLEM — K57.32 DIVERTICULITIS OF COLON: Status: ACTIVE | Noted: 2018-12-05

## 2019-03-04 NOTE — PHYSICAL EXAM
[Abdomen Tenderness] : ~T No ~M abdominal tenderness [Posterior] : posteriorly [de-identified] : well healed wound

## 2019-03-04 NOTE — HISTORY OF PRESENT ILLNESS
[FreeTextEntry1] : 63 F s/p emergent anterior resection after colonoscopic perforation here for a post op visit.  Her postoperative course was uncomplicated.  She does complain of change in bowel habits and LLQ discomfort.

## 2019-03-04 NOTE — ASSESSMENT
[FreeTextEntry1] : 63 F s/p anterior resection for diverticulitis well healed with posterior midline anal fissure.

## 2019-03-12 RX ORDER — NEOMYCIN SULFATE 500 MG/1
500 TABLET ORAL
Qty: 6 | Refills: 0 | Status: DISCONTINUED | COMMUNITY
Start: 2018-12-05 | End: 2019-03-12

## 2019-03-12 RX ORDER — AMOXICILLIN AND CLAVULANATE POTASSIUM 875; 125 MG/1; MG/1
875-125 TABLET, COATED ORAL
Qty: 20 | Refills: 0 | Status: DISCONTINUED | COMMUNITY
Start: 2018-12-05 | End: 2019-03-12

## 2019-03-12 RX ORDER — CIPROFLOXACIN HYDROCHLORIDE 500 MG/1
500 TABLET, FILM COATED ORAL
Qty: 14 | Refills: 0 | Status: DISCONTINUED | COMMUNITY
Start: 2017-03-16 | End: 2019-03-12

## 2019-03-12 RX ORDER — METRONIDAZOLE 500 MG/1
500 TABLET ORAL
Qty: 42 | Refills: 0 | Status: DISCONTINUED | COMMUNITY
Start: 2017-03-16 | End: 2019-03-12

## 2019-03-12 RX ORDER — METRONIDAZOLE 500 MG/1
500 TABLET ORAL 3 TIMES DAILY
Qty: 3 | Refills: 1 | Status: DISCONTINUED | COMMUNITY
Start: 2018-12-05 | End: 2019-03-12

## 2019-03-14 ENCOUNTER — APPOINTMENT (OUTPATIENT)
Dept: COLORECTAL SURGERY | Facility: CLINIC | Age: 64
End: 2019-03-14

## 2019-03-15 NOTE — ED ADULT TRIAGE NOTE - MODE OF ARRIVAL
PATIENT CALLING FOR REFILL ON THE FOLLOWING RX, PATIENT DOWN TO LAST PILL.  REQUESTS THAT REFILL GO TO THE [facility] ON [address] IN [place].    RX:  ADDERALL XR 15 MG 24 HR CAPSULE    PLS ADVISE   Walk in

## 2019-04-15 ENCOUNTER — APPOINTMENT (OUTPATIENT)
Dept: ORTHOPEDIC SURGERY | Facility: CLINIC | Age: 64
End: 2019-04-15
Payer: COMMERCIAL

## 2019-04-15 VITALS
BODY MASS INDEX: 23.23 KG/M2 | WEIGHT: 148 LBS | HEART RATE: 69 BPM | HEIGHT: 67 IN | SYSTOLIC BLOOD PRESSURE: 117 MMHG | DIASTOLIC BLOOD PRESSURE: 78 MMHG

## 2019-04-15 PROCEDURE — 99214 OFFICE O/P EST MOD 30 MIN: CPT

## 2019-04-15 PROCEDURE — 72050 X-RAY EXAM NECK SPINE 4/5VWS: CPT

## 2019-04-15 RX ORDER — METHOCARBAMOL 500 MG/1
500 TABLET, FILM COATED ORAL 3 TIMES DAILY
Qty: 30 | Refills: 0 | Status: ACTIVE | COMMUNITY
Start: 2019-04-15 | End: 1900-01-01

## 2019-04-15 NOTE — PHYSICAL EXAM
[Normal] : Gait: normal [UE] : Sensory: Intact in bilateral upper extremities [Bicep] : biceps 2+ and symmetric bilaterally [B.R.] : biceps 2+ and symmetric bilaterally [Gerber's Sign] : negative Gerber's sign [de-identified] : The pt is awake, alert and oriented to self, place and time, is comfortable and in no acute distress. Gait evaluation reveals a narrow based, non-ataxic, non-antalgic gait. Negative Romberg sign noted. Can heel and toe walk without difficulty. Inspection of the neck, back and upper extremities bilaterally reveals no rashes or ecchymotic lesions. There is no obvious abnormal spinal curvature in the sagittal and coronal planes. No crepitus or instability noted with range of motion of bilateral upper extremities. No joint laxity noted in the upper and lower extremities bilaterally. No atrophy or abnormal movements noted in the upper or lower extremities. No tenderness over the  thoracic, lumbar spine or in the upper and lower extremity musculature. Paraspinal cervical tenderness. There is no swelling noted in the upper or lower extremities bilaterally. No cervical lymphadenopathy noted anteriorly.\par Full range of motion of both shoulders. Negative Neer's sign and Hawkin's sign bilaterally. Negative Spurling's sign bilaterally. In the lumbar spine the patient can forward flex to mid tibia and extend 30 degrees without pain\par Negative Babinski sign and no clonus bilaterally in the upper or lower extremities. [de-identified] : Range of motion cervical spine limited due to discomfort and pain with forward flexion 40° extension of 30 left lateral rotation 25 and right lateral rotation 25 [de-identified] : 4 view cervical spine taken today demonstrate no significant scoliosis on the lateral projection normal cervical lordosis. Retrolisthesis with significant degeneration at C5-6 with loss of disc the endplate sclerosis and anterior and posterior osteophytes. Minimal loss of disc that also seen at C3-4. No dynamic instability between flexion-extension. No acute fractures.

## 2019-04-15 NOTE — DISCUSSION/SUMMARY
[Medication Risks Reviewed] : Medication risks reviewed [de-identified] : The patient today presents with neck pain and stiffness following the emergency laparotomy and colon surgery. 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 Recommended physical therapy for her but she cannot afford to go for physical therapy or take time off from work for this. A home exercise program as provided. I also recommended a trial of methocarbamol as a muscle relaxants and diclofenac cream was prescribed for her today as well. We discussed the option of cervical trigger point injections which she may consider follow up in 1-2 months. She understands MRI of the cervical spine followed by a cervical epidural steroid injections may also be an option of going forward.\par \par The patient was educated regarding their condition, treatment options as well as prescribed course of treatment. \par Risks and benefits as well as alternatives to the proposed treatment were also provided to the patient \par They were given the opportunity to have all their questions answered to their satisfaction.\par \par Vital signs were reviewed with the patient and the patient was instructed to followup with their primary care provider for further management.\par \par Healthy lifestyle recommendations were also made including a tobacco free lifestyle, proper diet, and weight control.

## 2019-04-15 NOTE — HISTORY OF PRESENT ILLNESS
[Worsening] : worsening [8] : a current pain level of 8/10 [None] : No relieving factors are noted [___ mths] : [unfilled] month(s) ago [de-identified] : Patient is here today for evaluation on her neck pain going on since January 2019 since having colonoscopy which resulted in emergency surgery due to perforate colon. Patient states since her surgery non stop neck pain. Diarrhea now.\par Neck pain since the surgery. Has been on muscle relaxants for left sided low back pain while in the hospital for her bowel surgery for 6-7 days. Felt neck pain with stiffness when she was discharged.\par Has stiffness in her neck specially right more than left turning.\par  in a school [de-identified] : sleeping

## 2019-06-03 ENCOUNTER — APPOINTMENT (OUTPATIENT)
Dept: ORTHOPEDIC SURGERY | Facility: CLINIC | Age: 64
End: 2019-06-03
Payer: COMMERCIAL

## 2019-06-03 VITALS
SYSTOLIC BLOOD PRESSURE: 101 MMHG | WEIGHT: 147 LBS | DIASTOLIC BLOOD PRESSURE: 70 MMHG | HEIGHT: 67 IN | BODY MASS INDEX: 23.07 KG/M2 | HEART RATE: 67 BPM

## 2019-06-03 DIAGNOSIS — G89.29 CERVICALGIA: ICD-10-CM

## 2019-06-03 DIAGNOSIS — M50.30 OTHER CERVICAL DISC DEGENERATION, UNSPECIFIED CERVICAL REGION: ICD-10-CM

## 2019-06-03 DIAGNOSIS — M54.2 CERVICALGIA: ICD-10-CM

## 2019-06-03 PROCEDURE — 99214 OFFICE O/P EST MOD 30 MIN: CPT

## 2019-06-03 RX ORDER — CELECOXIB 100 MG/1
100 CAPSULE ORAL TWICE DAILY
Qty: 30 | Refills: 0 | Status: ACTIVE | COMMUNITY
Start: 2019-06-03 | End: 1900-01-01

## 2019-06-03 RX ORDER — DICLOFENAC SODIUM 10 MG/G
1 GEL TOPICAL TWICE DAILY
Qty: 1 | Refills: 2 | Status: ACTIVE | COMMUNITY
Start: 2019-04-15 | End: 1900-01-01

## 2019-06-03 NOTE — HISTORY OF PRESENT ILLNESS
[7] : a current pain level of 7/10 [Daily] : ~He/She~ states the symptoms seem to be occuring daily [None] : No relieving factors are noted [de-identified] : Patient is here today for re evaluation on her neck pain. Patient states no changes finished the muscle relaxant which really did not help still having issue lying in bed sleeping and driving. [Stable] : stable [___ mths] : [unfilled] month(s) ago [de-identified] : driving lying in bed sleeping

## 2019-06-03 NOTE — PHYSICAL EXAM
[Normal] : Gait: normal [Gerber's Sign] : negative Gerber's sign [Bicep] : biceps 2+ and symmetric bilaterally [UE] : Sensory: Intact in bilateral upper extremities [de-identified] : Range of motion cervical spine limited due to discomfort and pain with forward flexion 40° extension of 30 left lateral rotation 25 and right lateral rotation 25 [de-identified] : The pt is awake, alert and oriented to self, place and time, is comfortable and in no acute distress. Gait evaluation reveals a narrow based, non-ataxic, non-antalgic gait. Negative Romberg sign noted. Can heel and toe walk without difficulty. Inspection of the neck, back and upper extremities bilaterally reveals no rashes or ecchymotic lesions. There is no obvious abnormal spinal curvature in the sagittal and coronal planes. No crepitus or instability noted with range of motion of bilateral upper extremities. No joint laxity noted in the upper and lower extremities bilaterally. No atrophy or abnormal movements noted in the upper or lower extremities. No tenderness over the  thoracic, lumbar spine or in the upper and lower extremity musculature. Paraspinal cervical tenderness. There is no swelling noted in the upper or lower extremities bilaterally. No cervical lymphadenopathy noted anteriorly.\par Full range of motion of both shoulders. Negative Neer's sign and Hawkin's sign bilaterally. Negative Spurling's sign bilaterally. In the lumbar spine the patient can forward flex to mid tibia and extend 30 degrees without pain\par Negative Babinski sign and no clonus bilaterally in the upper or lower extremities. [B.R.] : biceps 2+ and symmetric bilaterally

## 2019-06-03 NOTE — DISCUSSION/SUMMARY
[Medication Risks Reviewed] : Medication risks reviewed [de-identified] : At this point the patient continues to complain of significant neck pain. In the setting of this degeneration cervical spine and acute onset of symptoms I have recommended MRI of the cervical spine for further evaluation of her symptoms. Further treatment options and discuss the MRI has been performed and they include cervical epidural steroid injection or possible surgical intervention as appropriate.The trial of Celebrex was prescribed along with another prescription of Voltaren gel which had been denied by the insurance company at her last visit.\par \par The patient was educated regarding their condition, treatment options as well as prescribed course of treatment. \par Risks and benefits as well as alternatives to the proposed treatment were also provided to the patient \par They were given the opportunity to have all their questions answered to their satisfaction.\par \par Vital signs were reviewed with the patient and the patient was instructed to followup with their primary care provider for further management.\par \par Healthy lifestyle recommendations were also made including a tobacco free lifestyle, proper diet, and weight control.

## 2019-06-03 NOTE — PROGRESS NOTE ADULT - PROVIDER SPECIALTY LIST ADULT
General Surgery Colonoscopy Consult Note    Reason For Visit  Maxx Salinas is a 45 year old old male patient here today for a consultation Colonoscopy. S/P Diverticulitis . This patient  was  unaccompanied      Referred By  No Pcp    Care Team:   Patient Care Team:  No Pcp as PCP - General   Surgery

## 2019-06-24 ENCOUNTER — CHART COPY (OUTPATIENT)
Age: 64
End: 2019-06-24

## 2019-09-06 ENCOUNTER — APPOINTMENT (OUTPATIENT)
Dept: COLORECTAL SURGERY | Facility: CLINIC | Age: 64
End: 2019-09-06
Payer: COMMERCIAL

## 2019-09-06 VITALS
WEIGHT: 148 LBS | HEIGHT: 67 IN | SYSTOLIC BLOOD PRESSURE: 115 MMHG | HEART RATE: 98 BPM | BODY MASS INDEX: 23.23 KG/M2 | TEMPERATURE: 97.9 F | DIASTOLIC BLOOD PRESSURE: 76 MMHG

## 2019-09-06 DIAGNOSIS — K60.2 ANAL FISSURE, UNSPECIFIED: ICD-10-CM

## 2019-09-06 DIAGNOSIS — R19.8 OTHER SPECIFIED SYMPTOMS AND SIGNS INVOLVING THE DIGESTIVE SYSTEM AND ABDOMEN: ICD-10-CM

## 2019-09-06 PROCEDURE — 99244 OFF/OP CNSLTJ NEW/EST MOD 40: CPT

## 2019-09-07 PROBLEM — K60.2 ANAL FISSURE: Status: ACTIVE | Noted: 2019-02-21

## 2019-09-07 PROBLEM — R19.8 CHANGE IN BOWEL MOVEMENT: Status: ACTIVE | Noted: 2019-09-07

## 2019-09-07 NOTE — ASSESSMENT
[FreeTextEntry1] : 63-year-old female with anal fissure and changes in bowel habits most likely related to prior colon resection. I have had a lengthy discussion with the patient her symptoms are within the normal spectrum post colon resection. Explained to her that the symptoms of frequent bowel movements clustering of bowel movements is extremely common for the first inserted after surgery most of his symptoms will improve with time high fiber diet Metamucil daily. She is currently being treated appropriately for anal fissure and recommend medical treatment absolutely described her then no surgical treatment patient to be done for anal fissure given her frequent and loose bowel movements.

## 2019-09-07 NOTE — HISTORY OF PRESENT ILLNESS
[FreeTextEntry1] : 63-year-old female who in January of this year had a perforation from a colonoscopy followed by colon resection. Patient complains of changes of bowel habits with increasing frequency of bowel movements since the surgery also complains of anorectal pain from a fissure. Patient denies any fevers or chills nausea or vomiting

## 2019-09-07 NOTE — PHYSICAL EXAM
[Abdomen Masses] : No abdominal masses [Abdomen Tenderness] : ~T No ~M abdominal tenderness [Tender] : nontender [Posterior] : posteriorly [Reduce Spontaneously] : a spontaneously reducible (grade II) [Tender, Swollen] : tender, swollen [Normal Breath Sounds] : Normal breath sounds [JVD] : no jugular venous distention  [Normal Rate and Rhythm] : normal rate and rhythm [Normal Heart Sounds] : normal heart sounds [Alert] : alert [No Rash or Lesion] : No rash or lesion [Oriented to Place] : oriented to place [Oriented to Person] : oriented to person [Calm] : calm [Oriented to Time] : oriented to time [de-identified] : pupils equal reactive to light normocephalic atraumatic. [de-identified] : Looks well in no distress, of stated age. [de-identified] : moves all 4 extremities appropriately with 5 over 5 strength

## 2019-12-08 ENCOUNTER — TRANSCRIPTION ENCOUNTER (OUTPATIENT)
Age: 64
End: 2019-12-08

## 2019-12-09 ENCOUNTER — APPOINTMENT (OUTPATIENT)
Dept: SURGERY | Facility: CLINIC | Age: 64
End: 2019-12-09
Payer: COMMERCIAL

## 2019-12-09 ENCOUNTER — INPATIENT (INPATIENT)
Facility: HOSPITAL | Age: 64
LOS: 2 days | Discharge: HOME | End: 2019-12-12
Attending: SURGERY | Admitting: SURGERY
Payer: COMMERCIAL

## 2019-12-09 VITALS
SYSTOLIC BLOOD PRESSURE: 102 MMHG | TEMPERATURE: 98 F | DIASTOLIC BLOOD PRESSURE: 74 MMHG | OXYGEN SATURATION: 99 % | RESPIRATION RATE: 19 BRPM | HEART RATE: 117 BPM

## 2019-12-09 VITALS — SYSTOLIC BLOOD PRESSURE: 115 MMHG | DIASTOLIC BLOOD PRESSURE: 79 MMHG | HEART RATE: 110 BPM

## 2019-12-09 DIAGNOSIS — E78.5 HYPERLIPIDEMIA, UNSPECIFIED: ICD-10-CM

## 2019-12-09 DIAGNOSIS — K62.89 OTHER SPECIFIED DISEASES OF ANUS AND RECTUM: ICD-10-CM

## 2019-12-09 DIAGNOSIS — K62.5 HEMORRHAGE OF ANUS AND RECTUM: ICD-10-CM

## 2019-12-09 LAB
ALBUMIN SERPL ELPH-MCNC: 3.9 G/DL — SIGNIFICANT CHANGE UP (ref 3.3–5)
ALP SERPL-CCNC: 68 U/L — SIGNIFICANT CHANGE UP (ref 40–120)
ALT FLD-CCNC: 12 U/L — SIGNIFICANT CHANGE UP (ref 4–33)
ANION GAP SERPL CALC-SCNC: 10 MMO/L — SIGNIFICANT CHANGE UP (ref 7–14)
APTT BLD: 26.2 SEC — LOW (ref 27.5–36.3)
AST SERPL-CCNC: 52 U/L — HIGH (ref 4–32)
BASOPHILS # BLD AUTO: 0.03 K/UL — SIGNIFICANT CHANGE UP (ref 0–0.2)
BASOPHILS NFR BLD AUTO: 0.4 % — SIGNIFICANT CHANGE UP (ref 0–2)
BILIRUB SERPL-MCNC: 0.4 MG/DL — SIGNIFICANT CHANGE UP (ref 0.2–1.2)
BLD GP AB SCN SERPL QL: NEGATIVE — SIGNIFICANT CHANGE UP
BUN SERPL-MCNC: 14 MG/DL — SIGNIFICANT CHANGE UP (ref 7–23)
CALCIUM SERPL-MCNC: 9.3 MG/DL — SIGNIFICANT CHANGE UP (ref 8.4–10.5)
CHLORIDE SERPL-SCNC: 104 MMOL/L — SIGNIFICANT CHANGE UP (ref 98–107)
CO2 SERPL-SCNC: 26 MMOL/L — SIGNIFICANT CHANGE UP (ref 22–31)
CREAT SERPL-MCNC: 0.73 MG/DL — SIGNIFICANT CHANGE UP (ref 0.5–1.3)
EOSINOPHIL # BLD AUTO: 0.03 K/UL — SIGNIFICANT CHANGE UP (ref 0–0.5)
EOSINOPHIL NFR BLD AUTO: 0.4 % — SIGNIFICANT CHANGE UP (ref 0–6)
GLUCOSE SERPL-MCNC: 93 MG/DL — SIGNIFICANT CHANGE UP (ref 70–99)
HCT VFR BLD CALC: 29.5 % — LOW (ref 34.5–45)
HCT VFR BLD CALC: 33.6 % — LOW (ref 34.5–45)
HGB BLD-MCNC: 11.4 G/DL — LOW (ref 11.5–15.5)
HGB BLD-MCNC: 9.5 G/DL — LOW (ref 11.5–15.5)
IMM GRANULOCYTES NFR BLD AUTO: 0.4 % — SIGNIFICANT CHANGE UP (ref 0–1.5)
INR BLD: 1.04 — SIGNIFICANT CHANGE UP (ref 0.88–1.17)
LYMPHOCYTES # BLD AUTO: 1.18 K/UL — SIGNIFICANT CHANGE UP (ref 1–3.3)
LYMPHOCYTES # BLD AUTO: 15.9 % — SIGNIFICANT CHANGE UP (ref 13–44)
MCHC RBC-ENTMCNC: 30.4 PG — SIGNIFICANT CHANGE UP (ref 27–34)
MCHC RBC-ENTMCNC: 31.5 PG — SIGNIFICANT CHANGE UP (ref 27–34)
MCHC RBC-ENTMCNC: 32.2 % — SIGNIFICANT CHANGE UP (ref 32–36)
MCHC RBC-ENTMCNC: 33.9 % — SIGNIFICANT CHANGE UP (ref 32–36)
MCV RBC AUTO: 92.8 FL — SIGNIFICANT CHANGE UP (ref 80–100)
MCV RBC AUTO: 94.6 FL — SIGNIFICANT CHANGE UP (ref 80–100)
MONOCYTES # BLD AUTO: 0.42 K/UL — SIGNIFICANT CHANGE UP (ref 0–0.9)
MONOCYTES NFR BLD AUTO: 5.7 % — SIGNIFICANT CHANGE UP (ref 2–14)
NEUTROPHILS # BLD AUTO: 5.71 K/UL — SIGNIFICANT CHANGE UP (ref 1.8–7.4)
NEUTROPHILS NFR BLD AUTO: 77.2 % — HIGH (ref 43–77)
NRBC # FLD: 0 K/UL — SIGNIFICANT CHANGE UP (ref 0–0)
NRBC # FLD: 0 K/UL — SIGNIFICANT CHANGE UP (ref 0–0)
PLATELET # BLD AUTO: 219 K/UL — SIGNIFICANT CHANGE UP (ref 150–400)
PLATELET # BLD AUTO: 269 K/UL — SIGNIFICANT CHANGE UP (ref 150–400)
PMV BLD: 9.6 FL — SIGNIFICANT CHANGE UP (ref 7–13)
PMV BLD: 9.7 FL — SIGNIFICANT CHANGE UP (ref 7–13)
POTASSIUM SERPL-MCNC: 4 MMOL/L — SIGNIFICANT CHANGE UP (ref 3.5–5.3)
POTASSIUM SERPL-SCNC: 4 MMOL/L — SIGNIFICANT CHANGE UP (ref 3.5–5.3)
PROT SERPL-MCNC: 6.8 G/DL — SIGNIFICANT CHANGE UP (ref 6–8.3)
PROTHROM AB SERPL-ACNC: 11.9 SEC — SIGNIFICANT CHANGE UP (ref 9.8–13.1)
RBC # BLD: 3.12 M/UL — LOW (ref 3.8–5.2)
RBC # BLD: 3.62 M/UL — LOW (ref 3.8–5.2)
RBC # FLD: 13 % — SIGNIFICANT CHANGE UP (ref 10.3–14.5)
RBC # FLD: 13 % — SIGNIFICANT CHANGE UP (ref 10.3–14.5)
RH IG SCN BLD-IMP: NEGATIVE — SIGNIFICANT CHANGE UP
SODIUM SERPL-SCNC: 140 MMOL/L — SIGNIFICANT CHANGE UP (ref 135–145)
WBC # BLD: 7.25 K/UL — SIGNIFICANT CHANGE UP (ref 3.8–10.5)
WBC # BLD: 7.4 K/UL — SIGNIFICANT CHANGE UP (ref 3.8–10.5)
WBC # FLD AUTO: 7.25 K/UL — SIGNIFICANT CHANGE UP (ref 3.8–10.5)
WBC # FLD AUTO: 7.4 K/UL — SIGNIFICANT CHANGE UP (ref 3.8–10.5)

## 2019-12-09 PROCEDURE — 99222 1ST HOSP IP/OBS MODERATE 55: CPT | Mod: GC,57

## 2019-12-09 PROCEDURE — 71045 X-RAY EXAM CHEST 1 VIEW: CPT | Mod: 26

## 2019-12-09 PROCEDURE — 99203 OFFICE O/P NEW LOW 30 MIN: CPT

## 2019-12-09 RX ORDER — SODIUM CHLORIDE 9 MG/ML
1000 INJECTION INTRAMUSCULAR; INTRAVENOUS; SUBCUTANEOUS ONCE
Refills: 0 | Status: COMPLETED | OUTPATIENT
Start: 2019-12-09 | End: 2019-12-09

## 2019-12-09 RX ADMIN — SODIUM CHLORIDE 1000 MILLILITER(S): 9 INJECTION INTRAMUSCULAR; INTRAVENOUS; SUBCUTANEOUS at 15:00

## 2019-12-09 RX ADMIN — SODIUM CHLORIDE 1000 MILLILITER(S): 9 INJECTION INTRAMUSCULAR; INTRAVENOUS; SUBCUTANEOUS at 16:01

## 2019-12-09 RX ADMIN — SODIUM CHLORIDE 1000 MILLILITER(S): 9 INJECTION INTRAMUSCULAR; INTRAVENOUS; SUBCUTANEOUS at 16:00

## 2019-12-09 NOTE — H&P ADULT - ASSESSMENT
in progress...    Plan  - Admit to Dr. RANDY Mclain  - Clear liquid diet      - NPO after midnight  - Serial CBCs  - Plan for EUA, possible hemorrhoidectomy, possible sigmoidoscopy on 12/10  - Discussed with Dr. Mclain 63yo F w/ hx of HLD, endometriosis s/p appendectomy (~), varicose veins s/p vein stripping (),  (), cervical dysplasia s/p LEEP conization, partial hysterectomy with bladder suspension (~), left breast lumpectomy, left knee meniscus repair, nasal basal cell s/p excision and reconstruction and diverticulitis s/p colonoscopy (1/10/19) with perforation and subsequent LAR (1/10/19; Dr. Ko). Presented  from Dr. Mclain's office with 1 day of heavy rectal bleeding.    in progress...    Plan  - Admit to Dr. RANDY Mclain  - Clear liquid diet      - NPO after midnight  - Serial CBCs  - Plan for EUA, possible hemorrhoidectomy, possible sigmoidoscopy on 12/10  - Obtain medication list with dosages as patient not sure at time of admission  - Discussed with Dr. Mclain 63yo F w/ hx of HLD, endometriosis s/p appendectomy (~), varicose veins s/p vein stripping (),  (), cervical dysplasia s/p LEEP conization, partial hysterectomy with bladder suspension (~), left breast lumpectomy, left knee meniscus repair, nasal basal cell s/p excision and reconstruction and diverticulitis s/p colonoscopy (1/10/19) with perforation and subsequent LAR (1/10/19; Dr. Ko). Presented  from Dr. Mclain's office with 1 day of heavy rectal bleeding.    in progress...    Plan  - Admit to Dr. RANDY Mclain  - Clear liquid diet      - NPO after midnight  - Serial CBCs  - Consented and added on for EUA, possible hemorrhoidectomy, possible sigmoidoscopy on 12/10  - Obtain medication list with dosages as patient not sure at time of admission  - Discussed with Dr. Mclain 65yo F w/ hx of HLD, endometriosis s/p appendectomy (~), varicose veins s/p vein stripping (),  (), cervical dysplasia s/p LEEP conization, partial hysterectomy with bladder suspension (~), left breast lumpectomy, left knee meniscus repair, nasal basal cell s/p excision and reconstruction and diverticulitis s/p colonoscopy (1/10/19) with perforation and subsequent LAR (1/10/19; Dr. Ko). Presented  from Dr. Mclain's office with 1 day of heavy rectal bleeding.    Plan  - Admit to Dr. RANDY Mclain  - Clear liquid diet      - NPO after midnight  - Serial CBCs  - Consented and added on for EUA, possible hemorrhoidectomy, possible sigmoidoscopy on 12/10  - Obtain medication list with dosages as patient not sure at time of admission  - Discussed with Dr. Mclain

## 2019-12-09 NOTE — ED ADULT TRIAGE NOTE - CHIEF COMPLAINT QUOTE
Pt c/o BRBPR since last night  accompanied by weakness and sent from colorectal surg office. PMH diverticulitis with colorectal surg 1/19

## 2019-12-09 NOTE — PROGRESS NOTE ADULT - PROBLEM SELECTOR PLAN 2
EUS sigmoidoscopy under anesthesia evaluation of perianal area bx and possible surgery rul out hemorhoids granuloma tissue neoplasm

## 2019-12-09 NOTE — ED ADULT NURSE NOTE - PMH
Diverticulitis    Dyslipidemia    Endometriosis    H/O basal cell carcinoma excision  nose  Migraine headache    Mitral valve prolapse

## 2019-12-09 NOTE — H&P ADULT - HISTORY OF PRESENT ILLNESS
63yo F w/ hx of HLD, endometriosis s/p appendectomy (~), vericose veins s/p vein stripping (),  (), cervical dysplasia s/p LEEP conization, partial hysterectomy with bladder suspension (~), left breast lumpectomy, left knee meniscus repair, nasal basal cell s/p excision and reconstruction and diverticulitis s/p colonoscopy (1/10/19) with perforation and subsequent LAR (1/10/19; Dr. Ko). Presented  from Dr. Mclain's office with 1 day of heavy rectal bleeding. Patient stated that she developed rectal pain after LAR and was scheduled for flex sig in February, but couldn't tolerate prep. She subsequently had a CT in 2019 that showed diverticulosis w/o evidence of bowel inflammation. She saw her GI doc (Dr. Mcgovern) with negative workup and then got a second opinion wherein she was prescibed a diltiazem compound without relief. Patient reported that over the summer she was told she has an anal fissure that caused bleeding during BM and wiping, but she experience no heavy spontaneous rectal bleeding until  when she woke up with significant blood in her bed. She also had heavy bloody BM when she went to the bathroom and presented to Dr. Mclain's office  before being sent to the ED. She denies dizziness, N/V, CP, SOB, dysuria, constipation, or diarrhea.

## 2019-12-09 NOTE — H&P ADULT - NSICDXPASTSURGICALHX_GEN_ALL_CORE_FT
PAST SURGICAL HISTORY:  S/P appendectomy     S/P bladder repair     S/P      S/P hysterectomy     S/P reconstruction procedure S/P basal cell. Nose reconstruction surgery.

## 2019-12-09 NOTE — ED PROVIDER NOTE - ATTENDING CONTRIBUTION TO CARE
DR. BLOCH, ATTENDING MD-  I performed a face to face bedside interview with patient regarding history of present illness, review of symptoms and past medical history. I completed an independent physical exam.  I have discussed patient's plan of care with the resident.  Patient examined, well appearing NAD initially tachycardic, now HR 88, HEENT nml, heart sounds s1s2, Lungs clear, abd soft mild diffuse abd tenderness,  no edema, pulses intact, old abd scar vertical and horizontal in lower abd. neuro nonfocal. pulses intact

## 2019-12-09 NOTE — ED PROVIDER NOTE - OBJECTIVE STATEMENT
64F PMH sigmoid colon perforation during colonoscopy s/p repair presents with 1 day history of rectal bleeding.  Patient reports persistent bleeding since last night.  Patient followed up with surgeon, Dr. Mclain, this morning who instructed patient to hospital.  Patient reports minimal lower abdominal pain but denies nausea, vomiting, chest pain, dyspnea, fevers, chills, shortness of breath.

## 2019-12-09 NOTE — PROGRESS NOTE ADULT - ASSESSMENT
uncomfortble patient with rectal bleeding rectal pain admitted for monitoring  eus of perianal area  sigmoidoscopy and bx  under anesthesiaand evaluation of perianal area with bx and possible surgery

## 2019-12-09 NOTE — ED PROVIDER NOTE - PSH
S/P appendectomy    S/P bladder repair    S/P     S/P hysterectomy    S/P reconstruction procedure  S/P basal cell. Nose reconstruction surgery.

## 2019-12-09 NOTE — H&P ADULT - NSHPPHYSICALEXAM_GEN_ALL_CORE
Physical Exam:  Gen: NAD  LS: nml respiratory effort  Card: pulse regularly present  GI: abd soft, nontender  Rectal: bright red blood at rectal orifice with tenderness prohibiting DR exam  Ext: warm

## 2019-12-09 NOTE — PROGRESS NOTE ADULT - SUBJECTIVE AND OBJECTIVE BOX
Subjective: Patient is a 64y old  Female who presents with a chief complaint of heavy  Rectal Bleeding rectal pain   24hours she has a hx of diverticulitis  had colonoscopy perforation and subsequent surgery with LAR.Since then she has seen two surgeons for rectal pain told she had hemorhoids and an anal fissure last seen  by surgeon has ct scan 2019 no abnormalities seen attempted sigmoidoscopy soon after was aborted today admitted for evaluation of  profuserectal bleeding and pain    PAST MEDICAL & SURGICAL HISTORY:  H/O basal cell carcinoma excision: nose  Diverticulitis  Migraine headache  Mitral valve prolapse  Dyslipidemia  Endometriosis  S/P reconstruction procedure: S/P basal cell. Nose reconstruction surgery.  S/P bladder repair  S/P appendectomy  S/P hysterectomy  S/P       Allergies    No Known Allergies    Intolerances        MEDICATIONS  (STANDING):    MEDICATIONS  (PRN):      CONSTITUTIONAL: No fever, weight loss, or fatigue  EYES: No eye pain, visual disturbances, or discharge  ENMT:  No difficulty hearing, tinnitus, vertigo; No sinus or throat pain  NECK: No pain or stiffness  BREASTS: No pain, masses, or nipple discharge  RESPIRATORY: No cough, wheezing, chills or hemoptysis; No shortness of breath  CARDIOVASCULAR: No chest pain, palpitations, dizziness, or leg swelling  GASTROINTESTINAL: No abdominal or epigastric pain. No nausea, vomiting, or hematemesis; No diarrhea or constipation. No melena or hematochezia.  GENITOURINARY: No dysuria, frequency, hematuria, or incontinence  NEUROLOGICAL: No headaches, memory loss, loss of strength, numbness, or tremors  SKIN: No itching, burning, rashes, or lesions   LYMPH NODES: No enlarged glands  ENDOCRINE: No heat or cold intolerance; No hair loss  MUSCULOSKELETAL: No joint pain or swelling; No muscle, back, or extremity pain  PSYCHIATRIC: No depression, anxiety, mood swings, or difficulty sleeping  HEME/LYMPH: No easy bruising, or bleeding gums  ALLERY AND IMMUNOLOGIC: No hives or eczema      PHYSICAL EXAM:    GENERAL: Comfortable, mild distress HEAD:  Normocephalic, atraumatic  EYES: EOMI, PERRLA  HEENT: Moist mucous membranes  NECK: Supple, No JVD  NERVOUS SYSTEM:  Alert & Oriented X3, Motor Strength 5/5 B/L upper and lower extremities  CHEST/LUNG: Clear to auscultation bilaterally  HEART: Regular rate and rhythm  ABDOMEN: Soft, Nontender, Nondistended, Bowel sounds present mult scars  GENITOURINARY: Voiding, no palpable bladder  EXTREMITIES:   No clubbing, cyanosis, or edema  MUSCULOSKELTAL- No muscle tenderness, no joint tenderness  SKIN-no rash  rect fresh heme from orifice uclerated  prolapsing from anus hemorrhoids or neoplasia or granulation tissue          Vital Signs Last 24 Hrs  T(C): 37 (09 Dec 2019 17:32), Max: 37 (09 Dec 2019 17:32)  T(F): 98.6 (09 Dec 2019 17:32), Max: 98.6 (09 Dec 2019 17:32)  HR: 68 (09 Dec 2019 17:32) (68 - 117)  BP: 111/54 (09 Dec 2019 17:32) (102/74 - 111/54)  BP(mean): --  RR: 18 (09 Dec 2019 17:32) (18 - 19)  SpO2: 98% (09 Dec 2019 17:32) (98% - 99%)      LABS:        140  |  104  |  14  ----------------------------<  93  4.0   |  26  |  0.73    Ca    9.3      09 Dec 2019 14:50    TPro  6.8  /  Alb  3.9  /  TBili  0.4  /  DBili  x   /  AST  52<H>  /  ALT  12  /  AlkPhos  68      CBC Full  -  ( 09 Dec 2019 14:50 )  WBC Count : 7.40 K/uL  RBC Count : 3.62 M/uL  Hemoglobin : 11.4 g/dL  Hematocrit : 33.6 %  Platelet Count - Automated : 269 K/uL  Mean Cell Volume : 92.8 fL  Mean Cell Hemoglobin : 31.5 pg  Mean Cell Hemoglobin Concentration : 33.9 %  Auto Neutrophil # : 5.71 K/uL  Auto Lymphocyte # : 1.18 K/uL  Auto Monocyte # : 0.42 K/uL  Auto Eosinophil # : 0.03 K/uL  Auto Basophil # : 0.03 K/uL  Auto Neutrophil % : 77.2 %  Auto Lymphocyte % : 15.9 %  Auto Monocyte % : 5.7 %  Auto Eosinophil % : 0.4 %  Auto Basophil % : 0.4 %      LIVER FUNCTIONS - ( 09 Dec 2019 14:50 )  Alb: 3.9 g/dL / Pro: 6.8 g/dL / ALK PHOS: 68 u/L / ALT: 12 u/L / AST: 52 u/L / GGT: x                 EKG:    Imaging Studies:    Impression / Plan:

## 2019-12-09 NOTE — ED PROVIDER NOTE - CLINICAL SUMMARY MEDICAL DECISION MAKING FREE TEXT BOX
64F presents with rectal bleeding.   Surgery consult.  CBC, CMP, PT, T&S.  Will determine if needs transfusion.  Admit likely to surgery

## 2019-12-09 NOTE — H&P ADULT - NSHPLABSRESULTS_GEN_ALL_CORE
11.4                  Neurophils% (auto):   77.2   (12-09 @ 14:50):    7.40 )-----------(269          Lymphocytes% (auto):  15.9                                          33.6                   Eosinphils% (auto):   0.4      Manual%: Neutrophils x    ; Lymphocytes x    ; Eosinophils x    ; Bands%: x    ; Blasts x          12-09    140  |  104  |  14  ----------------------------<  93  4.0   |  26  |  0.73    Ca    9.3      09 Dec 2019 14:50    TPro  6.8  /  Alb  3.9  /  TBili  0.4  /  DBili  x   /  AST  52<H>  /  ALT  12  /  AlkPhos  68  12-09    ( 12-09 @ 14:50 )   PT: 11.9 SEC;   INR: 1.04   aPTT: 26.2 SEC        RECENT CULTURES:        Imaging:    EXAM:  XR CHEST AP OR PA 1V      PROCEDURE DATE:  Dec  9 2019     INTERPRETATION:  CLINICAL INDICATION: preoperative evaluation for medical   clearance    EXAM:  Portable upright frontal chest from 12/9/2019 at 1536. Compared to prior   studyfrom 1/11/2019.     IMPRESSION:  Clear lungs. No pleural effusions or pneumothorax.    Cardiac and mediastinal silhouettes within normal limits.    Trachea midline.    Generalized mild osteopenia.

## 2019-12-09 NOTE — ED ADULT NURSE NOTE - OBJECTIVE STATEMENT
A&ox3 pt presents from colorectal surgeon office for BRBPR with clots since yesterday. Denies abdominal pain, nausea/vomiting. States she feels mildly SOB on exertion and generalized weaness. No acute respiratory distress noted. Lungs CTA. Skin warm, dry, color appropriate.

## 2019-12-10 ENCOUNTER — RESULT REVIEW (OUTPATIENT)
Age: 64
End: 2019-12-10

## 2019-12-10 LAB
ANION GAP SERPL CALC-SCNC: 10 MMO/L — SIGNIFICANT CHANGE UP (ref 7–14)
BUN SERPL-MCNC: 8 MG/DL — SIGNIFICANT CHANGE UP (ref 7–23)
CALCIUM SERPL-MCNC: 8.7 MG/DL — SIGNIFICANT CHANGE UP (ref 8.4–10.5)
CHLORIDE SERPL-SCNC: 106 MMOL/L — SIGNIFICANT CHANGE UP (ref 98–107)
CO2 SERPL-SCNC: 22 MMOL/L — SIGNIFICANT CHANGE UP (ref 22–31)
CREAT SERPL-MCNC: 0.64 MG/DL — SIGNIFICANT CHANGE UP (ref 0.5–1.3)
GLUCOSE SERPL-MCNC: 87 MG/DL — SIGNIFICANT CHANGE UP (ref 70–99)
HCT VFR BLD CALC: 26.5 % — LOW (ref 34.5–45)
HCT VFR BLD CALC: 29.9 % — LOW (ref 34.5–45)
HGB BLD-MCNC: 8.9 G/DL — LOW (ref 11.5–15.5)
HGB BLD-MCNC: 9.8 G/DL — LOW (ref 11.5–15.5)
MAGNESIUM SERPL-MCNC: 2.1 MG/DL — SIGNIFICANT CHANGE UP (ref 1.6–2.6)
MCHC RBC-ENTMCNC: 31.1 PG — SIGNIFICANT CHANGE UP (ref 27–34)
MCHC RBC-ENTMCNC: 31.2 PG — SIGNIFICANT CHANGE UP (ref 27–34)
MCHC RBC-ENTMCNC: 32.8 % — SIGNIFICANT CHANGE UP (ref 32–36)
MCHC RBC-ENTMCNC: 33.6 % — SIGNIFICANT CHANGE UP (ref 32–36)
MCV RBC AUTO: 93 FL — SIGNIFICANT CHANGE UP (ref 80–100)
MCV RBC AUTO: 94.9 FL — SIGNIFICANT CHANGE UP (ref 80–100)
NRBC # FLD: 0 K/UL — SIGNIFICANT CHANGE UP (ref 0–0)
NRBC # FLD: 0 K/UL — SIGNIFICANT CHANGE UP (ref 0–0)
PHOSPHATE SERPL-MCNC: 3.2 MG/DL — SIGNIFICANT CHANGE UP (ref 2.5–4.5)
PLATELET # BLD AUTO: 198 K/UL — SIGNIFICANT CHANGE UP (ref 150–400)
PLATELET # BLD AUTO: 265 K/UL — SIGNIFICANT CHANGE UP (ref 150–400)
PMV BLD: 9.7 FL — SIGNIFICANT CHANGE UP (ref 7–13)
PMV BLD: 9.9 FL — SIGNIFICANT CHANGE UP (ref 7–13)
POTASSIUM SERPL-MCNC: 4.3 MMOL/L — SIGNIFICANT CHANGE UP (ref 3.5–5.3)
POTASSIUM SERPL-SCNC: 4.3 MMOL/L — SIGNIFICANT CHANGE UP (ref 3.5–5.3)
RBC # BLD: 2.85 M/UL — LOW (ref 3.8–5.2)
RBC # BLD: 3.15 M/UL — LOW (ref 3.8–5.2)
RBC # FLD: 13 % — SIGNIFICANT CHANGE UP (ref 10.3–14.5)
RBC # FLD: 13.2 % — SIGNIFICANT CHANGE UP (ref 10.3–14.5)
RH IG SCN BLD-IMP: NEGATIVE — SIGNIFICANT CHANGE UP
SODIUM SERPL-SCNC: 138 MMOL/L — SIGNIFICANT CHANGE UP (ref 135–145)
WBC # BLD: 4.56 K/UL — SIGNIFICANT CHANGE UP (ref 3.8–10.5)
WBC # BLD: 8.32 K/UL — SIGNIFICANT CHANGE UP (ref 3.8–10.5)
WBC # FLD AUTO: 4.56 K/UL — SIGNIFICANT CHANGE UP (ref 3.8–10.5)
WBC # FLD AUTO: 8.32 K/UL — SIGNIFICANT CHANGE UP (ref 3.8–10.5)

## 2019-12-10 PROCEDURE — 88305 TISSUE EXAM BY PATHOLOGIST: CPT | Mod: 26

## 2019-12-10 PROCEDURE — 45160 EXCISION OF RECTAL LESION: CPT

## 2019-12-10 PROCEDURE — 93010 ELECTROCARDIOGRAM REPORT: CPT

## 2019-12-10 PROCEDURE — 45382 COLONOSCOPY W/CONTROL BLEED: CPT

## 2019-12-10 RX ORDER — ACETAMINOPHEN 500 MG
1000 TABLET ORAL ONCE
Refills: 0 | Status: COMPLETED | OUTPATIENT
Start: 2019-12-11 | End: 2019-12-11

## 2019-12-10 RX ORDER — ACETAMINOPHEN 500 MG
1000 TABLET ORAL ONCE
Refills: 0 | Status: COMPLETED | OUTPATIENT
Start: 2019-12-10 | End: 2019-12-10

## 2019-12-10 RX ORDER — SODIUM CHLORIDE 9 MG/ML
1000 INJECTION INTRAMUSCULAR; INTRAVENOUS; SUBCUTANEOUS
Refills: 0 | Status: DISCONTINUED | OUTPATIENT
Start: 2019-12-10 | End: 2019-12-11

## 2019-12-10 RX ADMIN — SODIUM CHLORIDE 115 MILLILITER(S): 9 INJECTION INTRAMUSCULAR; INTRAVENOUS; SUBCUTANEOUS at 01:29

## 2019-12-10 RX ADMIN — Medication 400 MILLIGRAM(S): at 23:15

## 2019-12-10 RX ADMIN — SODIUM CHLORIDE 115 MILLILITER(S): 9 INJECTION INTRAMUSCULAR; INTRAVENOUS; SUBCUTANEOUS at 23:15

## 2019-12-10 RX ADMIN — Medication 1000 MILLIGRAM(S): at 13:15

## 2019-12-10 RX ADMIN — SODIUM CHLORIDE 115 MILLILITER(S): 9 INJECTION INTRAMUSCULAR; INTRAVENOUS; SUBCUTANEOUS at 20:00

## 2019-12-10 RX ADMIN — SODIUM CHLORIDE 115 MILLILITER(S): 9 INJECTION INTRAMUSCULAR; INTRAVENOUS; SUBCUTANEOUS at 12:40

## 2019-12-10 RX ADMIN — Medication 400 MILLIGRAM(S): at 12:40

## 2019-12-10 NOTE — CHART NOTE - NSCHARTNOTEFT_GEN_A_CORE
POST-OPERATIVE NOTE    Subjective:  Patient is s/p EUA, firm mass noted just proximal to anal verge. Biopsied. Anal canal packed. Recovering appropriately. Pt now with anxiety that mass may be malignant and is tearful, otherwise pain controlled    Vital Signs Last 24 Hrs  T(C): 36.7 (10 Dec 2019 21:57), Max: 36.9 (10 Dec 2019 01:24)  T(F): 98.1 (10 Dec 2019 21:57), Max: 98.4 (10 Dec 2019 01:24)  HR: 95 (10 Dec 2019 21:57) (63 - 95)  BP: 112/55 (10 Dec 2019 21:57) (98/61 - 119/53)  BP(mean): 98 (10 Dec 2019 21:00) (70 - 98)  RR: 18 (10 Dec 2019 21:57) (15 - 20)  SpO2: 100% (10 Dec 2019 21:57) (98% - 100%)  I&O's Detail    10 Dec 2019 07:01  -  10 Dec 2019 22:36  --------------------------------------------------------  IN:    sodium chloride 0.9%.: 690 mL  Total IN: 690 mL    OUT:    Voided: 500 mL  Total OUT: 500 mL    Total NET: 190 mL          PAST MEDICAL & SURGICAL HISTORY:  H/O basal cell carcinoma excision: nose  Diverticulitis  Migraine headache  Mitral valve prolapse  Dyslipidemia  Endometriosis  S/P reconstruction procedure: S/P basal cell. Nose reconstruction surgery.  S/P bladder repair  S/P appendectomy  S/P hysterectomy  S/P         Physical Exam:  General: NAD, resting comfortably in bed  Pulmonary: Nonlabored breathing, no respiratory distress  Abdominal: soft, ND, packing in place  Extremities: WWP      LABS:                        9.8    8.32  )-----------( 265      ( 10 Dec 2019 07:20 )             29.9     12-10    138  |  106  |  8   ----------------------------<  87  4.3   |  22  |  0.64    Ca    8.7      10 Dec 2019 07:20  Phos  3.2     12-10  Mg     2.1     12-10    TPro  6.8  /  Alb  3.9  /  TBili  0.4  /  DBili  x   /  AST  52<H>  /  ALT  12  /  AlkPhos  68  12-    PT/INR - ( 09 Dec 2019 14:50 )   PT: 11.9 SEC;   INR: 1.04          PTT - ( 09 Dec 2019 14:50 )  PTT:26.2 SEC      Assessment:  The patient is a 64y Female who is now several hours post-op from above    Plan:  - Pain control as needed  - NPO/IVF  - OOB and ambulating as tolerated  - F/u AM labs

## 2019-12-10 NOTE — PROGRESS NOTE ADULT - SUBJECTIVE AND OBJECTIVE BOX
Subjective: Patient is a 64y old  Femalehx depression anxiety multiple abd surgeries who presents with a chief complaint of heavy  Rectal Bleeding rectal pain   24hours she has a hx of diverticulitis  had colonoscopy perforation and subsequent surgery with LAR.Since then she has seen two surgeons for rectal pain told she had hemorhoids and an anal fissure last seen  by surgeon has ct scan 2019 no abnormalities seen attempted sigmoidoscopy soon after was aborted today admitted for evaluation of  profuserectal bleeding and pain    PAST MEDICAL & SURGICAL HISTORY:  H/O basal cell carcinoma excision: nose  Diverticulitis  Migraine headache  Mitral valve prolapse  Dyslipidemia  Endometriosis  S/P reconstruction procedure: S/P basal cell. Nose reconstruction surgery.  S/P bladder repair  S/P appendectomy  S/P hysterectomy  S/P       Allergies    No Known Allergies    Intolerances        MEDICATIONS  (STANDING):    MEDICATIONS  (PRN):      CONSTITUTIONAL: No fever, weight loss, or fatigue  EYES: No eye pain, visual disturbances, or discharge  ENMT:  No difficulty hearing, tinnitus, vertigo; No sinus or throat pain  NECK: No pain or stiffness  BREASTS: No pain, masses, or nipple discharge  RESPIRATORY: No cough, wheezing, chills or hemoptysis; No shortness of breath  CARDIOVASCULAR: No chest pain, palpitations, dizziness, or leg swelling  GASTROINTESTINAL: No abdominal or epigastric pain. No nausea, vomiting, or hematemesis; No diarrhea or constipation. No melena or hematochezia.  GENITOURINARY: No dysuria, frequency, hematuria, or incontinence  NEUROLOGICAL: No headaches, memory loss, loss of strength, numbness, or tremors  SKIN: No itching, burning, rashes, or lesions   LYMPH NODES: No enlarged glands  ENDOCRINE: No heat or cold intolerance; No hair loss  MUSCULOSKELETAL: No joint pain or swelling; No muscle, back, or extremity pain  PSYCHIATRIC: No depression, anxiety, mood swings, or difficulty sleeping  HEME/LYMPH: No easy bruising, or bleeding gums  ALLERY AND IMMUNOLOGIC: No hives or eczema      PHYSICAL EXAM:    GENERAL: Comfortable, mild distress HEAD:  Normocephalic, atraumatic  EYES: EOMI, PERRLA  HEENT: Moist mucous membranes  NECK: Supple, No JVD  NERVOUS SYSTEM:  Alert & Oriented X3, Motor Strength 5/5 B/L upper and lower extremities  CHEST/LUNG: Clear to auscultation bilaterally  HEART: Regular rate and rhythm  ABDOMEN: Soft, Nontender, Nondistended, Bowel sounds present mult scars  GENITOURINARY: Voiding, no palpable bladder  EXTREMITIES:   No clubbing, cyanosis, or edema  MUSCULOSKELTAL- No muscle tenderness, no joint tenderness  SKIN-no rash  rect fresh heme from orifice uclerated  prolapsing from anus hemorrhoids or neoplasia or granulation tissue          Vital Signs Last 24 Hrs  T(C): 37 (09 Dec 2019 17:32), Max: 37 (09 Dec 2019 17:32)  T(F): 98.6 (09 Dec 2019 17:32), Max: 98.6 (09 Dec 2019 17:32)  HR: 68 (09 Dec 2019 17:32) (68 - 117)  BP: 111/54 (09 Dec 2019 17:32) (102/74 - 111/54)  BP(mean): --  RR: 18 (09 Dec 2019 17:32) (18 - 19)  SpO2: 98% (09 Dec 2019 17:32) (98% - 99%)      LABS:Complete Blood Count (12.10.19 @ 03:10)    WBC Count: 4.56 K/uL    RBC Count: 2.85 M/uL    Hemoglobin: 8.9 g/dL    Hematocrit: 26.5 %    Mean Cell Volume: 93.0 fL    Mean Cell Hemoglobin: 31.2 pg    Mean Cell Hemoglobin Conc: 33.6 %    Red Cell Distrib Width: 13.0 %    Platelet Count - Automated: 198 K/uL    MPV: 9.7 fl    Nucleated RBC #: 0 K/uL            140  |  104  |  14  ----------------------------<  93  4.0   |  26  |  0.73    Ca    9.3      09 Dec 2019 14:50    TPro  6.8  /  Alb  3.9  /  TBili  0.4  /  DBili  x   /  AST  52<H>  /  ALT  12  /  AlkPhos  68      CBC Full  -  ( 09 Dec 2019 14:50 )  WBC Count : 7.40 K/uL  RBC Count : 3.62 M/uL  Hemoglobin : 11.4 g/dL  Hematocrit : 33.6 %  Platelet Count - Automated : 269 K/uL  Mean Cell Volume : 92.8 fL  Mean Cell Hemoglobin : 31.5 pg  Mean Cell Hemoglobin Concentration : 33.9 %  Auto Neutrophil # : 5.71 K/uL  Auto Lymphocyte # : 1.18 K/uL  Auto Monocyte # : 0.42 K/uL  Auto Eosinophil # : 0.03 K/uL  Auto Basophil # : 0.03 K/uL  Auto Neutrophil % : 77.2 %  Auto Lymphocyte % : 15.9 %  Auto Monocyte % : 5.7 %  Auto Eosinophil % : 0.4 %  Auto Basophil % : 0.4 %      LIVER FUNCTIONS - ( 09 Dec 2019 14:50 )  Alb: 3.9 g/dL / Pro: 6.8 g/dL / ALK PHOS: 68 u/L / ALT: 12 u/L / AST: 52 u/L / GGT: x                 EKG:    Imaging Studies:    Impression / Plan:

## 2019-12-10 NOTE — PROGRESS NOTE ADULT - ASSESSMENT
65yo F admitted for heavy rectal bleeding. PAtient has hx of diverticulitis s/p colonoscopy (1/10/19) with perforation and subsequent LAR (1/10/19; Dr. Ko).    PLAN:  - Consented and added on for today for  EUA, possible hemorrhoidectomy, possible sigmoidoscopy on 12/10  - Serial CBCs  - POC  - Monitor BMs    Team A Surgery  [id number]

## 2019-12-10 NOTE — PROGRESS NOTE ADULT - SUBJECTIVE AND OBJECTIVE BOX
Surgery Daily Progress Note    SUBJECTIVE:  - Patient seen and examined at bedside during morning rounds  - Patient states feeling better, decreased abdominal pain  - Denies chest pain, SOB, N/V  --------------------------------------------------------------------------------------------------  OBJECTIVE:   Physical Exam:  General: AAOx3, NAD, lying comfortably in bed  HEENT: NC/AT  Respiratory: nonlabored breathing  Cardiovascular: RRR, normal S1 and S2, no murmurs or gallops  Abdomen: non-distended, soft, mildly tender  Extremities: WWP, no edema  --------------------------------------------------------------------------------------------------  V/S:  Vital Signs Last 24 Hrs  T(C): 36.7 (10 Dec 2019 10:43), Max: 37 (09 Dec 2019 17:32)  T(F): 98.1 (10 Dec 2019 10:43), Max: 98.6 (09 Dec 2019 17:32)  HR: 88 (10 Dec 2019 10:43) (63 - 95)  BP: 104/61 (10 Dec 2019 10:43) (98/61 - 124/79)  BP(mean): --  RR: 17 (10 Dec 2019 10:43) (17 - 18)  SpO2: 100% (10 Dec 2019 10:43) (98% - 100%)    --------------------------------------------------------------------------------------------------  I/Os:    --------------------------------------------------------------------------------------------------  LABS:                        9.8    8.32  )-----------( 265      ( 10 Dec 2019 07:20 )             29.9     10 Dec 2019 07:20    138    |  106    |  8      ----------------------------<  87     4.3     |  22     |  0.64     Ca    8.7        10 Dec 2019 07:20  Phos  3.2       10 Dec 2019 07:20  Mg     2.1       10 Dec 2019 07:20    TPro  6.8    /  Alb  3.9    /  TBili  0.4    /  DBili  x      /  AST  52     /  ALT  12     /  AlkPhos  68     09 Dec 2019 14:50    PT/INR - ( 09 Dec 2019 14:50 )   PT: 11.9 SEC;   INR: 1.04          PTT - ( 09 Dec 2019 14:50 )  PTT:26.2 SEC  CAPILLARY BLOOD GLUCOSE            LIVER FUNCTIONS - ( 09 Dec 2019 14:50 )  Alb: 3.9 g/dL / Pro: 6.8 g/dL / ALK PHOS: 68 u/L / ALT: 12 u/L / AST: 52 u/L / GGT: x               --------------------------------------------------------------------------------------------------  MEDICATIONS  (STANDING):  sodium chloride 0.9%. 1000 milliLiter(s) (115 mL/Hr) IV Continuous <Continuous>    MEDICATIONS  (PRN):

## 2019-12-10 NOTE — BRIEF OPERATIVE NOTE - OPERATION/FINDINGS
EUA, firm mass noted just proximal to anal verge, located posterior lateral. Biopsied. Anal canal packed with gel foam/surgicel

## 2019-12-10 NOTE — BRIEF OPERATIVE NOTE - NSICDXBRIEFPROCEDURE_GEN_ALL_CORE_FT
PROCEDURES:  Rectal biopsy 10-Dec-2019 18:50:00  Willian Valdovinos  Exam under anesthesia, anus 10-Dec-2019 18:49:51  Willian Valdovinos

## 2019-12-10 NOTE — PROGRESS NOTE ADULT - ASSESSMENT
uncomfortble patient with rectal bleeding rectal pain admitted for monitoring  eus of perianal area  sigmoidoscopy and bx  under anesthesiaand evaluation of perianal area with bx and possible surgery marked drop in hb since yesterday  hb 8.9 will need to be transfused as well

## 2019-12-11 LAB
ANION GAP SERPL CALC-SCNC: 16 MMO/L — HIGH (ref 7–14)
BUN SERPL-MCNC: 5 MG/DL — LOW (ref 7–23)
CALCIUM SERPL-MCNC: 8.7 MG/DL — SIGNIFICANT CHANGE UP (ref 8.4–10.5)
CHLORIDE SERPL-SCNC: 106 MMOL/L — SIGNIFICANT CHANGE UP (ref 98–107)
CO2 SERPL-SCNC: 17 MMOL/L — LOW (ref 22–31)
CREAT SERPL-MCNC: 0.6 MG/DL — SIGNIFICANT CHANGE UP (ref 0.5–1.3)
GLUCOSE BLDC GLUCOMTR-MCNC: 174 MG/DL — HIGH (ref 70–99)
GLUCOSE SERPL-MCNC: 68 MG/DL — LOW (ref 70–99)
HCT VFR BLD CALC: 27.1 % — LOW (ref 34.5–45)
HGB BLD-MCNC: 8.6 G/DL — LOW (ref 11.5–15.5)
MAGNESIUM SERPL-MCNC: 1.9 MG/DL — SIGNIFICANT CHANGE UP (ref 1.6–2.6)
MCHC RBC-ENTMCNC: 30.8 PG — SIGNIFICANT CHANGE UP (ref 27–34)
MCHC RBC-ENTMCNC: 31.7 % — LOW (ref 32–36)
MCV RBC AUTO: 97.1 FL — SIGNIFICANT CHANGE UP (ref 80–100)
NRBC # FLD: 0 K/UL — SIGNIFICANT CHANGE UP (ref 0–0)
PHOSPHATE SERPL-MCNC: 3.2 MG/DL — SIGNIFICANT CHANGE UP (ref 2.5–4.5)
PLATELET # BLD AUTO: 284 K/UL — SIGNIFICANT CHANGE UP (ref 150–400)
PMV BLD: 10.2 FL — SIGNIFICANT CHANGE UP (ref 7–13)
POTASSIUM SERPL-MCNC: 4 MMOL/L — SIGNIFICANT CHANGE UP (ref 3.5–5.3)
POTASSIUM SERPL-SCNC: 4 MMOL/L — SIGNIFICANT CHANGE UP (ref 3.5–5.3)
RBC # BLD: 2.79 M/UL — LOW (ref 3.8–5.2)
RBC # FLD: 13.1 % — SIGNIFICANT CHANGE UP (ref 10.3–14.5)
SODIUM SERPL-SCNC: 139 MMOL/L — SIGNIFICANT CHANGE UP (ref 135–145)
WBC # BLD: 8.95 K/UL — SIGNIFICANT CHANGE UP (ref 3.8–10.5)
WBC # FLD AUTO: 8.95 K/UL — SIGNIFICANT CHANGE UP (ref 3.8–10.5)

## 2019-12-11 RX ORDER — SENNA PLUS 8.6 MG/1
1 TABLET ORAL ONCE
Refills: 0 | Status: COMPLETED | OUTPATIENT
Start: 2019-12-11 | End: 2019-12-11

## 2019-12-11 RX ORDER — SIMVASTATIN 20 MG/1
20 TABLET, FILM COATED ORAL AT BEDTIME
Refills: 0 | Status: DISCONTINUED | OUTPATIENT
Start: 2019-12-11 | End: 2019-12-12

## 2019-12-11 RX ORDER — SODIUM CHLORIDE 9 MG/ML
1000 INJECTION, SOLUTION INTRAVENOUS
Refills: 0 | Status: DISCONTINUED | OUTPATIENT
Start: 2019-12-11 | End: 2019-12-11

## 2019-12-11 RX ADMIN — SIMVASTATIN 20 MILLIGRAM(S): 20 TABLET, FILM COATED ORAL at 21:35

## 2019-12-11 RX ADMIN — SODIUM CHLORIDE 75 MILLILITER(S): 9 INJECTION, SOLUTION INTRAVENOUS at 09:52

## 2019-12-11 RX ADMIN — Medication 400 MILLIGRAM(S): at 05:41

## 2019-12-11 RX ADMIN — SODIUM CHLORIDE 115 MILLILITER(S): 9 INJECTION INTRAMUSCULAR; INTRAVENOUS; SUBCUTANEOUS at 05:41

## 2019-12-11 RX ADMIN — SENNA PLUS 1 TABLET(S): 8.6 TABLET ORAL at 17:16

## 2019-12-11 NOTE — PROVIDER CONTACT NOTE (OTHER) - ASSESSMENT
VSS except BPs 92-94/49-56 since 15:00. Pt denies dizziness or lightheadedness. Asymptomatic. Pt does not appear in distress. Pt endorses that these values are similar to her baseline prior to hospitalization.

## 2019-12-11 NOTE — PROGRESS NOTE ADULT - ASSESSMENT
uncomfortble patient with rectal bleeding rectal pain admitted for monitoring  eus of perianal area  sigmoidoscopy and bx  under anesthesiaand evaluation of perianal area with bx and possible surgery marked drop in hb had bx yesterday

## 2019-12-11 NOTE — PROGRESS NOTE ADULT - ASSESSMENT
63yo F admitted for heavy rectal bleeding. Patient has hx of diverticulitis s/p colonoscopy (1/10/19) with perforation and subsequent LAR (1/10/19; Dr. Ko); underwent scope yesterday, EUS, and biopsy of anorectal bass    - Pain control  - Diet: LRD  - IVF  - Will restart DVT ppx once Hct stabilizes  - F/u PM CBC  - OOB  - Dispo: d/c today    Surgery A team   [id number]

## 2019-12-11 NOTE — PROGRESS NOTE ADULT - SUBJECTIVE AND OBJECTIVE BOX
Subjective: Patient is a 64y old  Femalehx depression anxiety multiple abd surgeries who presents with a chief complaint of heavy  Rectal Bleeding rectal pain   24hours she has a hx of diverticulitis  had colonoscopy perforation and subsequent surgery with LAR.Since then she has seen two surgeons for rectal pain told she had hemorhoids and an anal fissure last seen  by surgeon has ct scan 2019 no abnormalities seen attempted sigmoidoscopy soon after was aborted    admitted for evaluation of  profuse rectal bleeding and pain anal mass     PAST MEDICAL & SURGICAL HISTORY:  H/O basal cell carcinoma excision: nose  Diverticulitis  Migraine headache  Mitral valve prolapse  Dyslipidemia  Endometriosis  S/P reconstruction procedure: S/P basal cell. Nose reconstruction surgery.  S/P bladder repair  S/P appendectomy  S/P hysterectomy  S/P       Allergies    No Known Allergies    Intolerances        MEDICATIONS  (STANDING):    MEDICATIONS  (PRN):      CONSTITUTIONAL: No fever, weight loss, or fatigue  EYES: No eye pain, visual disturbances, or discharge  ENMT:  No difficulty hearing, tinnitus, vertigo; No sinus or throat pain  NECK: No pain or stiffness  BREASTS: No pain, masses, or nipple discharge  RESPIRATORY: No cough, wheezing, chills or hemoptysis; No shortness of breath  CARDIOVASCULAR: No chest pain, palpitations, dizziness, or leg swelling  GASTROINTESTINAL: No abdominal or epigastric pain. No nausea, vomiting, or hematemesis; No diarrhea or constipation. No melena or hematochezia.  GENITOURINARY: No dysuria, frequency, hematuria, or incontinence  NEUROLOGICAL: No headaches, memory loss, loss of strength, numbness, or tremors  SKIN: No itching, burning, rashes, or lesions   LYMPH NODES: No enlarged glands  ENDOCRINE: No heat or cold intolerance; No hair loss  MUSCULOSKELETAL: No joint pain or swelling; No muscle, back, or extremity pain  PSYCHIATRIC: No depression, anxiety, mood swings, or difficulty sleeping  HEME/LYMPH: No easy bruising, or bleeding gums  ALLERY AND IMMUNOLOGIC: No hives or eczema      PHYSICAL EXAM: afebrike vs stable     GENERAL: Comfortable, mild distress HEAD:  Normocephalic, atraumatic  EYES: EOMI, PERRLA  HEENT: Moist mucous membranes  NECK: Supple, No JVD  NERVOUS SYSTEM:  Alert & Oriented X3, Motor Strength 5/5 B/L upper and lower extremities  CHEST/LUNG: Clear to auscultation bilaterally  HEART: Regular rate and rhythm  ABDOMEN: Soft, Nontender, Nondistended, Bowel sounds present mult scars  GENITOURINARY: Voiding, no palpable bladder  EXTREMITIES:   No clubbing, cyanosis, or edema  MUSCULOSKELTAL- No muscle tenderness, no joint tenderness  SKIN-no rash  rect fresh heme from orifice uclerated  prolapsing from anus hemorrhoids or neoplasia or granulation tissue    Complete Blood Count (12.10.19 @ 07:20)    WBC Count: 8.32 K/uL    RBC Count: 3.15 M/uL    Hemoglobin: 9.8 g/dL    Hematocrit: 29.9 %    Mean Cell Volume: 94.9 fL    Mean Cell Hemoglobin: 31.1 pg    Mean Cell Hemoglobin Conc: 32.8 %    Red Cell Distrib Width: 13.2 %    Platelet Count - Automated: 265 K/uL    MPV: 9.9 fl    Nucleated RBC #: 0 K/uL                TPro  6.8  /  Alb  3.9  /  TBili  0.4  /  DBili  x   /  AST  52<H>  /  ALT  12  /  AlkPhos  68      CBC Full  -  ( 09 Dec 2019 14:50 )  WBC Count : 7.40 K/uL  RBC Count : 3.62 M/uL  Hemoglobin : 11.4 g/dL  Hematocrit : 33.6 %  Platelet Count - Automated : 269 K/uL  Mean Cell Volume : 92.8 fL  Mean Cell Hemoglobin : 31.5 pg  Mean Cell Hemoglobin Concentration : 33.9 %  Auto Neutrophil # : 5.71 K/uL  Auto Lymphocyte # : 1.18 K/uL  Auto Monocyte # : 0.42 K/uL  Auto Eosinophil # : 0.03 K/uL  Auto Basophil # : 0.03 K/uL  Auto Neutrophil % : 77.2 %  Auto Lymphocyte % : 15.9 %  Auto Monocyte % : 5.7 %  Auto Eosinophil % : 0.4 %< from: Xray Chest 1 View AP/PA (19 @ 15:36) >  EXAM:  XR CHEST AP OR PA 1V        PROCEDURE DATE:  Dec  9 2019         INTERPRETATION:  CLINICAL INDICATION: preoperative evaluation for medical   clearance    EXAM:  Portable upright frontal chest from 2019 at 1536. Compared to prior   studyfrom 2019.     IMPRESSION:  Clear lungs. No pleural effusions or pneumothorax.    Cardiac and mediastinal silhouettes within normal limits.    Trachea midline.    Generalized     < end of copied text >    Auto Basophil % : 0.4 %      LIVER FUNCTIONS - ( 09 Dec 2019 14:50 )  Alb: 3.9 g/dL / Pro: 6.8 g/dL / ALK PHOS: 68 u/L / ALT: 12 u/L / AST: 52 u/L / GGT: x                 EKG:    Imaging Studies:    Impression / Plan:

## 2019-12-11 NOTE — PROGRESS NOTE ADULT - SUBJECTIVE AND OBJECTIVE BOX
SUBJECTIVE: Patient seen and examined this morning. No acute events overnight. Pain well controlled. Tolerating diet. Denies N/V/D/F/C.     MEDICATIONS  (STANDING):  dextrose 5% + sodium chloride 0.45%. 1000 milliLiter(s) (75 mL/Hr) IV Continuous <Continuous>  simvastatin 20 milliGRAM(s) Oral at bedtime    MEDICATIONS  (PRN):      OBJECTIVE:    Vital Signs Last 24 Hrs  T(C): 36.7 (11 Dec 2019 09:49), Max: 36.9 (10 Dec 2019 13:26)  T(F): 98.1 (11 Dec 2019 09:49), Max: 98.5 (11 Dec 2019 05:36)  HR: 88 (11 Dec 2019 09:49) (74 - 98)  BP: 100/59 (11 Dec 2019 09:49) (100/59 - 119/53)  BP(mean): 98 (10 Dec 2019 21:00) (70 - 98)  RR: 17 (11 Dec 2019 09:49) (15 - 20)  SpO2: 100% (11 Dec 2019 09:49) (98% - 100%)    Gen: NAD  Chest: non-labored breathing, no respiratory distress  CV: Pulse regular presently  Abdomen: Soft, non-tender, non-distended, dressing in place in anus  Extremities: warm and well perfused    I&O's Summary    10 Dec 2019 07:01  -  11 Dec 2019 07:00  --------------------------------------------------------  IN: 690 mL / OUT: 1300 mL / NET: -610 mL    11 Dec 2019 07:01  -  11 Dec 2019 13:16  --------------------------------------------------------  IN: 0 mL / OUT: 800 mL / NET: -800 mL      I&O's Detail    10 Dec 2019 07:01  -  11 Dec 2019 07:00  --------------------------------------------------------  IN:    sodium chloride 0.9%: 690 mL  Total IN: 690 mL    OUT:    Voided: 1300 mL  Total OUT: 1300 mL    Total NET: -610 mL      11 Dec 2019 07:01  -  11 Dec 2019 13:16  --------------------------------------------------------  IN:  Total IN: 0 mL    OUT:    Voided: 800 mL  Total OUT: 800 mL    Total NET: -800 mL            LABS:                        8.6    8.95  )-----------( 284      ( 11 Dec 2019 07:15 )             27.1     12-11    139  |  106  |  5<L>  ----------------------------<  68<L>  4.0   |  17<L>  |  0.60    Ca    8.7      11 Dec 2019 07:15  Phos  3.2     12-11  Mg     1.9     12-11    TPro  6.8  /  Alb  3.9  /  TBili  0.4  /  DBili  x   /  AST  52<H>  /  ALT  12  /  AlkPhos  68  12-09    PT/INR - ( 09 Dec 2019 14:50 )   PT: 11.9 SEC;   INR: 1.04          PTT - ( 09 Dec 2019 14:50 )  PTT:26.2 SEC      RADIOLOGY & ADDITIONAL STUDIES:

## 2019-12-12 ENCOUNTER — TRANSCRIPTION ENCOUNTER (OUTPATIENT)
Age: 64
End: 2019-12-12

## 2019-12-12 VITALS
RESPIRATION RATE: 15 BRPM | HEART RATE: 88 BPM | SYSTOLIC BLOOD PRESSURE: 109 MMHG | DIASTOLIC BLOOD PRESSURE: 61 MMHG | TEMPERATURE: 98 F | OXYGEN SATURATION: 99 %

## 2019-12-12 LAB
ANION GAP SERPL CALC-SCNC: 14 MMO/L — SIGNIFICANT CHANGE UP (ref 7–14)
BUN SERPL-MCNC: 7 MG/DL — SIGNIFICANT CHANGE UP (ref 7–23)
CALCIUM SERPL-MCNC: 8.8 MG/DL — SIGNIFICANT CHANGE UP (ref 8.4–10.5)
CHLORIDE SERPL-SCNC: 105 MMOL/L — SIGNIFICANT CHANGE UP (ref 98–107)
CO2 SERPL-SCNC: 21 MMOL/L — LOW (ref 22–31)
CREAT SERPL-MCNC: 0.6 MG/DL — SIGNIFICANT CHANGE UP (ref 0.5–1.3)
GLUCOSE SERPL-MCNC: 97 MG/DL — SIGNIFICANT CHANGE UP (ref 70–99)
HCT VFR BLD CALC: 27.2 % — LOW (ref 34.5–45)
HGB BLD-MCNC: 8.8 G/DL — LOW (ref 11.5–15.5)
MAGNESIUM SERPL-MCNC: 2.1 MG/DL — SIGNIFICANT CHANGE UP (ref 1.6–2.6)
MCHC RBC-ENTMCNC: 30.3 PG — SIGNIFICANT CHANGE UP (ref 27–34)
MCHC RBC-ENTMCNC: 32.4 % — SIGNIFICANT CHANGE UP (ref 32–36)
MCV RBC AUTO: 93.8 FL — SIGNIFICANT CHANGE UP (ref 80–100)
NRBC # FLD: 0 K/UL — SIGNIFICANT CHANGE UP (ref 0–0)
PHOSPHATE SERPL-MCNC: 2.5 MG/DL — SIGNIFICANT CHANGE UP (ref 2.5–4.5)
PLATELET # BLD AUTO: 290 K/UL — SIGNIFICANT CHANGE UP (ref 150–400)
PMV BLD: 10.3 FL — SIGNIFICANT CHANGE UP (ref 7–13)
POTASSIUM SERPL-MCNC: 3.7 MMOL/L — SIGNIFICANT CHANGE UP (ref 3.5–5.3)
POTASSIUM SERPL-SCNC: 3.7 MMOL/L — SIGNIFICANT CHANGE UP (ref 3.5–5.3)
RBC # BLD: 2.9 M/UL — LOW (ref 3.8–5.2)
RBC # FLD: 13.3 % — SIGNIFICANT CHANGE UP (ref 10.3–14.5)
SODIUM SERPL-SCNC: 140 MMOL/L — SIGNIFICANT CHANGE UP (ref 135–145)
WBC # BLD: 10.29 K/UL — SIGNIFICANT CHANGE UP (ref 3.8–10.5)
WBC # FLD AUTO: 10.29 K/UL — SIGNIFICANT CHANGE UP (ref 3.8–10.5)

## 2019-12-12 RX ORDER — ASPIRIN/CALCIUM CARB/MAGNESIUM 324 MG
81 TABLET ORAL DAILY
Refills: 0 | Status: DISCONTINUED | OUTPATIENT
Start: 2019-12-12 | End: 2019-12-12

## 2019-12-12 RX ADMIN — Medication 81 MILLIGRAM(S): at 12:35

## 2019-12-12 NOTE — DISCHARGE NOTE NURSING/CASE MANAGEMENT/SOCIAL WORK - PATIENT PORTAL LINK FT
You can access the FollowMyHealth Patient Portal offered by [facility] by registering at the following website: [email or web address]. By joining [employer]’s FollowMyHealth portal, you will also be able to view your health information using other applications (apps) compatible with our system.

## 2019-12-12 NOTE — DISCHARGE NOTE PROVIDER - CARE PROVIDERS DIRECT ADDRESSES
[email or web address]

## 2019-12-12 NOTE — DISCHARGE NOTE PROVIDER - NSDCMRMEDTOKEN_GEN_ALL_CORE_FT
acetaminophen 325 mg oral tablet: 2 tab(s) orally every 6 hours  amoxicillin-clavulanate 875 mg-125 mg oral tablet: 1 tab(s) orally 2 times a day  aspirin 81 mg oral tablet: 1 tab(s) orally once a day  oxyCODONE 10 mg oral tablet: 1 tab(s) orally every 6 hours, As needed, Severe Pain (7 - 10)  oxyCODONE 5 mg oral tablet: 1 tab(s) orally every 4 hours, As needed, Moderate Pain (4 - 6) MDD:6  rolling walker: 1 unit(s)   Zocor 20 mg oral tablet: 1 tab(s) orally once a day (at bedtime) acetaminophen 325 mg oral tablet: 2 tab(s) orally every 6 hours  aspirin 81 mg oral tablet: 1 tab(s) orally once a day  Zocor 20 mg oral tablet: 1 tab(s) orally once a day (at bedtime)

## 2019-12-12 NOTE — DISCHARGE NOTE PROVIDER - CARE PROVIDER_API CALL
Stefan Mclain)  ColonRectal Surgery; Surgery  [address] [place] [address]  Phone: [phone]  Fax: [phone]  Follow Up Time: 2 weeks    Ryan Mcgovern)  Gastroenterology; Internal Medicine  [address] [place] [address], [address]  [place] [address]  Phone: [phone]  Fax: [phone]  Follow Up Time: 2 weeks

## 2019-12-12 NOTE — PROGRESS NOTE ADULT - ASSESSMENT
65yo F admitted for heavy rectal bleeding. Patient has hx of diverticulitis s/p colonoscopy (1/10/19) with perforation and subsequent LAR (1/10/19; Dr. Ko); underwent scope yesterday, EUS, and biopsy of anorectal bass    PLAN:   - Pain control  - Diet: LRD  - IVF  - Will restart DVT ppx once Hct stabilizes  - F/u PM CBC  - OOB  - Dispo: d/c today    Surgery A team   [id number]

## 2019-12-12 NOTE — DISCHARGE NOTE NURSING/CASE MANAGEMENT/SOCIAL WORK - NSDCPNINST_GEN_ALL_CORE
Call MD for any c/o rectal bleed, or pain not relieved after medicated, fever >101.0 and a return appointment.

## 2019-12-12 NOTE — DISCHARGE NOTE PROVIDER - PROVIDER TOKENS
PROVIDER:[TOKEN:[[id number]],FOLLOWUP:[2 weeks]],PROVIDER:[TOKEN:[[id number]],FOLLOWUP:[2 weeks]]

## 2019-12-12 NOTE — PROGRESS NOTE ADULT - SUBJECTIVE AND OBJECTIVE BOX
Subjective: Patient is a 64y old  Femalehx depression anxiety multiple abd surgeries who presents with a chief complaint of heavy  Rectal Bleeding rectal pain   24hours she has a hx of diverticulitis  had colonoscopy perforation and subsequent surgery with LAR.Since then she has seen two surgeons for rectal pain told she had hemorhoids and an anal fissure last seen  by surgeon has ct scan 2019 no abnormalities seen attempted sigmoidoscopy soon after was aborted     admitted for evaluation of  profuse rectal bleeding and pain anal mass had bx     PAST MEDICAL & SURGICAL HISTORY:  H/O basal cell carcinoma excision: nose  Diverticulitis  Migraine headache  Mitral valve prolapse  Dyslipidemia  Endometriosis  S/P reconstruction procedure: S/P basal cell. Nose reconstruction surgery.  S/P bladder repair  S/P appendectomy  S/P hysterectomy  S/P       Allergies    No Known Allergies    Intolerances        MEDICATIONS  (STANDING):    MEDICATIONS  (PRN):      CONSTITUTIONAL: No fever, weight loss, or fatigue  EYES: No eye pain, visual disturbances, or discharge  ENMT:  No difficulty hearing, tinnitus, vertigo; No sinus or throat pain  NECK: No pain or stiffness  BREASTS: No pain, masses, or nipple discharge  RESPIRATORY: No cough, wheezing, chills or hemoptysis; No shortness of breath  CARDIOVASCULAR: No chest pain, palpitations, dizziness, or leg swelling  GASTROINTESTINAL: No abdominal or epigastric pain. No nausea, vomiting, or hematemesis; No diarrhea or constipation. No melena or hematochezia.  GENITOURINARY: No dysuria, frequency, hematuria, or incontinence  NEUROLOGICAL: No headaches, memory loss, loss of strength, numbness, or tremors  SKIN: No itching, burning, rashes, or lesions   LYMPH NODES: No enlarged glands  ENDOCRINE: No heat or cold intolerance; No hair loss  MUSCULOSKELETAL: No joint pain or swelling; No muscle, back, or extremity pain  PSYCHIATRIC: No depression, anxiety, mood swings, or difficulty sleeping  HEME/LYMPH: No easy bruising, or bleeding gums  ALLERY AND IMMUNOLOGIC: No hives or eczema      PHYSICAL EXAM: afebrike vs stable 132/78    GENERAL: Comfortable, mild distress HEAD:  Normocephalic, atraumatic  EYES: EOMI, PERRLA  HEENT: Moist mucous membranes  NECK: Supple, No JVD  NERVOUS SYSTEM:  Alert & Oriented X3, Motor Strength 5/5 B/L upper and lower extremities  CHEST/LUNG: Clear to auscultation bilaterally  HEART: Regular rate and rhythm  ABDOMEN: Soft, Nontender, Nondistended, Bowel sounds present mult scars  GENITOURINARY: Voiding, no palpable bladder  EXTREMITIES:   No clubbing, cyanosis, or edema  MUSCULOSKELTAL- No muscle tenderness, no joint tenderness  SKIN-no rash  rect fresh heme from orifice uclerated  prolapsing from anus hemorrhoids or neoplasia or granulation tissue packed  Basic Metabolic Panel w/Mg &amp; Inorg Phos (19 @ 07:15)    Sodium, Serum: 139 mmol/L    Potassium, Serum: 4.0 mmol/L    Chloride, Serum: 106 mmol/L    Carbon Dioxide, Serum: 17 mmol/L    Anion Gap, Serum: 16 mmo/L    Blood Urea Nitrogen, Serum: 5 mg/dL    Creatinine, Serum: 0.60 mg/dL    Glucose, Serum: 68 mg/dL    Calcium, Total Serum: 8.7 mg/dL    Magnesium, Serum: 1.9 mg/dL    Phosphorus Level, Serum: 3.2 mg/dL  Complete Blood Count in AM (19 @ 07:15)    WBC Count: 8.95 K/uL    RBC Count: 2.79 M/uL    Hemoglobin: 8.6 g/dL    Hematocrit: 27.1 %    Mean Cell Volume: 97.1 fL    Mean Cell Hemoglobin: 30.8 pg    Mean Cell Hemoglobin Conc: 31.7 %    Red Cell Distrib Width: 13.1 %    Platelet Count - Automated: 284 K/uL    MPV: 10.2 fl    Nucleated RBC #: 0 K/uL      Complete Blood Count (12.10.19 @ 07:20)    WBC Count: 8.32 K/uL    RBC Count: 3.15 M/uL    Hemoglobin: 9.8 g/dL    Hematocrit: 29.9 %    Mean Cell Volume: 94.9 fL    Mean Cell Hemoglobin: 31.1 pg    Mean Cell Hemoglobin Conc: 32.8 %    Red Cell Distrib Width: 13.2 %    Platelet Count - Automated: 265 K/uL    MPV: 9.9 fl    Nucleated RBC #: 0 K/uL                TPro  6.8  /  Alb  3.9  /  TBili  0.4  /  DBili  x   /  AST  52<H>  /  ALT  12  /  AlkPhos  68  12-    CBC Full  -  ( 09 Dec 2019 14:50 )  WBC Count : 7.40 K/uL  RBC Count : 3.62 M/uL  Hemoglobin : 11.4 g/dL  Hematocrit : 33.6 %  Platelet Count - Automated : 269 K/uL  Mean Cell Volume : 92.8 fL  Mean Cell Hemoglobin : 31.5 pg  Mean Cell Hemoglobin Concentration : 33.9 %  Auto Neutrophil # : 5.71 K/uL  Auto Lymphocyte # : 1.18 K/uL  Auto Monocyte # : 0.42 K/uL  Auto Eosinophil # : 0.03 K/uL  Auto Basophil # : 0.03 K/uL  Auto Neutrophil % : 77.2 %  Auto Lymphocyte % : 15.9 %  Auto Monocyte % : 5.7 %  Auto Eosinophil % : 0.4 %< from: Xray Chest 1 View AP/PA (19 @ 15:36) >  EXAM:  XR CHEST AP OR PA 1V        PROCEDURE DATE:  Dec  9 2019         INTERPRETATION:  CLINICAL INDICATION: preoperative evaluation for medical   clearance    EXAM:  Portable upright frontal chest from 2019 at 1536. Compared to prior   studyfrom 2019.     IMPRESSION:  Clear lungs. No pleural effusions or pneumothorax.    Cardiac and mediastinal silhouettes within normal limits.    Trachea midline.    Generalized     < end of copied text >    Auto Basophil % : 0.4 %      LIVER FUNCTIONS - ( 09 Dec 2019 14:50 )  Alb: 3.9 g/dL / Pro: 6.8 g/dL / ALK PHOS: 68 u/L / ALT: 12 u/L / AST: 52 u/L / GGT: x                 EKG:    Imaging Studies:    Impression / Plan:

## 2019-12-12 NOTE — DISCHARGE NOTE PROVIDER - NSDCCPCAREPLAN_GEN_ALL_CORE_FT
PRINCIPAL DISCHARGE DIAGNOSIS  Diagnosis: Rectal bleeding  Assessment and Plan of Treatment: found to have a mass, s/p biopsy follow up with Dr Mclain and Dr Mcgovern for results.

## 2019-12-12 NOTE — PROGRESS NOTE ADULT - ASSESSMENT
uncomfortble patient with rectal bleeding rectal pain admitted for monitoring  eus of perianal area  sigmoidoscopy and bx  under anesthesiaand evaluation of perianal area with bx and possible surgery marked drop in hb had bx  rectum packed awaiitng path monitor hb pain control

## 2019-12-12 NOTE — PROGRESS NOTE ADULT - SUBJECTIVE AND OBJECTIVE BOX
Surgery Daily progress Note    SUBJECTIVE: Patient seen and examined this morning. No acute events overnight. Pain well controlled. Tolerating diet. Denies N/V/D/F/C.     OBJECTIVE:  Gen: NAD  Chest: non-labored breathing, no respiratory distress  CV: Pulse regular presently  Abdomen: Soft, non-tender, non-distended, dressing in place in anus  Extremities: warm and well perfused     V/S:  Vital Signs Last 24 Hrs  T(C): 36.9 (12 Dec 2019 05:32), Max: 37.6 (11 Dec 2019 15:01)  T(F): 98.5 (12 Dec 2019 05:32), Max: 99.6 (11 Dec 2019 15:01)  HR: 88 (12 Dec 2019 05:32) (71 - 98)  BP: 109/61 (12 Dec 2019 05:32) (92/55 - 109/61)  BP(mean): --  RR: 15 (12 Dec 2019 05:32) (15 - 18)  SpO2: 99% (12 Dec 2019 05:32) (99% - 100%)    --------------------------------------------------------------------------------------------------  I/Os:    11 Dec 2019 07:01  -  12 Dec 2019 07:00  --------------------------------------------------------  IN:  Total IN: 0 mL    OUT:    Voided: 800 mL  Total OUT: 800 mL    Total NET: -800 mL        --------------------------------------------------------------------------------------------------  LABS:                        8.8    10.29 )-----------( 290      ( 12 Dec 2019 05:52 )             27.2     12 Dec 2019 05:52    140    |  105    |  7      ----------------------------<  97     3.7     |  21     |  0.60     Ca    8.8        12 Dec 2019 05:52  Phos  2.5       12 Dec 2019 05:52  Mg     2.1       12 Dec 2019 05:52        CAPILLARY BLOOD GLUCOSE      POCT Blood Glucose.: 174 mg/dL (11 Dec 2019 12:08)    --------------------------------------------------------------------------------------------------  MEDICATIONS  (STANDING):  aspirin  chewable 81 milliGRAM(s) Oral daily  simvastatin 20 milliGRAM(s) Oral at bedtime

## 2019-12-12 NOTE — DISCHARGE NOTE PROVIDER - HOSPITAL COURSE
64y old  Female hx depression anxiety multiple abd surgeries who presents with a chief complaint of heavy Rectal Bleeding rectal pain. Patient admitted to surgery. She went to the OR with Dr Mclain on 12/10. she is s/p EUA, firm mass noted just proximal to anal verge. Biopsied. Anal canal packed. Post op patients diet was slowly advanced as tolerated.  At this time, pt is tolerating a regular diet, ambulating and voiding. She no long has bloodly bowel movements. She did not require blood transfuions while admitted. Pt has been deemed stable for discharge at this time with outpatient follow up for biopsy results.

## 2019-12-12 NOTE — PROGRESS NOTE ADULT - REASON FOR ADMISSION
Rectal Bleeding
Rectal Bleeding anemia mass
Rectal Bleeding prolapsed mass from rectum
Rectal Bleeding rectal pain
Rectal Bleeding rectal pain

## 2019-12-17 LAB — SURGICAL PATHOLOGY STUDY: SIGNIFICANT CHANGE UP

## 2020-01-15 ENCOUNTER — EMERGENCY (EMERGENCY)
Facility: HOSPITAL | Age: 65
LOS: 1 days | Discharge: HOME | End: 2020-01-15
Attending: EMERGENCY MEDICINE
Payer: COMMERCIAL

## 2020-01-15 VITALS
HEART RATE: 99 BPM | WEIGHT: 141.98 LBS | OXYGEN SATURATION: 99 % | HEIGHT: 65 IN | RESPIRATION RATE: 19 BRPM | TEMPERATURE: 98 F | DIASTOLIC BLOOD PRESSURE: 72 MMHG | SYSTOLIC BLOOD PRESSURE: 105 MMHG

## 2020-01-15 VITALS
RESPIRATION RATE: 20 BRPM | SYSTOLIC BLOOD PRESSURE: 110 MMHG | TEMPERATURE: 98 F | OXYGEN SATURATION: 99 % | DIASTOLIC BLOOD PRESSURE: 70 MMHG | HEART RATE: 88 BPM

## 2020-01-15 PROBLEM — Z98.890 OTHER SPECIFIED POSTPROCEDURAL STATES: Chronic | Status: ACTIVE | Noted: 2019-12-09

## 2020-01-15 LAB
BASE EXCESS BLDV CALC-SCNC: 1.9 MMOL/L — SIGNIFICANT CHANGE UP (ref -2–2)
BASOPHILS # BLD AUTO: 0.02 K/UL — SIGNIFICANT CHANGE UP (ref 0–0.2)
BASOPHILS NFR BLD AUTO: 0.2 % — SIGNIFICANT CHANGE UP (ref 0–2)
CA-I SERPL-SCNC: 1.21 MMOL/L — SIGNIFICANT CHANGE UP (ref 1.12–1.3)
CHLORIDE BLDV-SCNC: 105 MMOL/L — SIGNIFICANT CHANGE UP (ref 96–108)
CO2 BLDV-SCNC: 27 MMOL/L — SIGNIFICANT CHANGE UP (ref 22–30)
EOSINOPHIL # BLD AUTO: 0.01 K/UL — SIGNIFICANT CHANGE UP (ref 0–0.5)
EOSINOPHIL NFR BLD AUTO: 0.1 % — SIGNIFICANT CHANGE UP (ref 0–6)
GAS PNL BLDV: 138 MMOL/L — SIGNIFICANT CHANGE UP (ref 135–145)
GAS PNL BLDV: SIGNIFICANT CHANGE UP
GAS PNL BLDV: SIGNIFICANT CHANGE UP
GLUCOSE BLDV-MCNC: 92 MG/DL — SIGNIFICANT CHANGE UP (ref 70–99)
HCO3 BLDV-SCNC: 26 MMOL/L — SIGNIFICANT CHANGE UP (ref 21–29)
HCT VFR BLD CALC: 30.8 % — LOW (ref 34.5–45)
HCT VFR BLDA CALC: 30 % — LOW (ref 39–50)
HGB BLD CALC-MCNC: 9.9 G/DL — LOW (ref 11.5–15.5)
HGB BLD-MCNC: 9.6 G/DL — LOW (ref 11.5–15.5)
IMM GRANULOCYTES NFR BLD AUTO: 0.4 % — SIGNIFICANT CHANGE UP (ref 0–1.5)
LACTATE BLDV-MCNC: 2.4 MMOL/L — HIGH (ref 0.7–2)
LYMPHOCYTES # BLD AUTO: 1.46 K/UL — SIGNIFICANT CHANGE UP (ref 1–3.3)
LYMPHOCYTES # BLD AUTO: 14 % — SIGNIFICANT CHANGE UP (ref 13–44)
MAGNESIUM SERPL-MCNC: 2.3 MG/DL — SIGNIFICANT CHANGE UP (ref 1.6–2.6)
MCHC RBC-ENTMCNC: 27.4 PG — SIGNIFICANT CHANGE UP (ref 27–34)
MCHC RBC-ENTMCNC: 31.2 GM/DL — LOW (ref 32–36)
MCV RBC AUTO: 88 FL — SIGNIFICANT CHANGE UP (ref 80–100)
MONOCYTES # BLD AUTO: 0.52 K/UL — SIGNIFICANT CHANGE UP (ref 0–0.9)
MONOCYTES NFR BLD AUTO: 5 % — SIGNIFICANT CHANGE UP (ref 2–14)
NEUTROPHILS # BLD AUTO: 8.39 K/UL — HIGH (ref 1.8–7.4)
NEUTROPHILS NFR BLD AUTO: 80.3 % — HIGH (ref 43–77)
NRBC # BLD: 0 /100 WBCS — SIGNIFICANT CHANGE UP (ref 0–0)
PCO2 BLDV: 38 MMHG — SIGNIFICANT CHANGE UP (ref 35–50)
PH BLDV: 7.44 — SIGNIFICANT CHANGE UP (ref 7.35–7.45)
PHOSPHATE SERPL-MCNC: 3.2 MG/DL — SIGNIFICANT CHANGE UP (ref 2.5–4.5)
PLATELET # BLD AUTO: 404 K/UL — HIGH (ref 150–400)
PO2 BLDV: 26 MMHG — SIGNIFICANT CHANGE UP (ref 25–45)
POTASSIUM BLDV-SCNC: 3.9 MMOL/L — SIGNIFICANT CHANGE UP (ref 3.5–5.3)
RAPID RVP RESULT: DETECTED
RBC # BLD: 3.5 M/UL — LOW (ref 3.8–5.2)
RBC # FLD: 14.5 % — SIGNIFICANT CHANGE UP (ref 10.3–14.5)
RV+EV RNA SPEC QL NAA+PROBE: DETECTED
SAO2 % BLDV: 44 % — LOW (ref 67–88)
URATE SERPL-MCNC: 3.5 MG/DL — SIGNIFICANT CHANGE UP (ref 2.5–7)
WBC # BLD: 10.44 K/UL — SIGNIFICANT CHANGE UP (ref 3.8–10.5)
WBC # FLD AUTO: 10.44 K/UL — SIGNIFICANT CHANGE UP (ref 3.8–10.5)

## 2020-01-15 PROCEDURE — 82435 ASSAY OF BLOOD CHLORIDE: CPT

## 2020-01-15 PROCEDURE — 71045 X-RAY EXAM CHEST 1 VIEW: CPT | Mod: 26

## 2020-01-15 PROCEDURE — 99284 EMERGENCY DEPT VISIT MOD MDM: CPT

## 2020-01-15 PROCEDURE — 96374 THER/PROPH/DIAG INJ IV PUSH: CPT

## 2020-01-15 PROCEDURE — 96375 TX/PRO/DX INJ NEW DRUG ADDON: CPT

## 2020-01-15 PROCEDURE — 82803 BLOOD GASES ANY COMBINATION: CPT

## 2020-01-15 PROCEDURE — 87040 BLOOD CULTURE FOR BACTERIA: CPT

## 2020-01-15 PROCEDURE — 87486 CHLMYD PNEUM DNA AMP PROBE: CPT

## 2020-01-15 PROCEDURE — 84100 ASSAY OF PHOSPHORUS: CPT

## 2020-01-15 PROCEDURE — 84550 ASSAY OF BLOOD/URIC ACID: CPT

## 2020-01-15 PROCEDURE — 80053 COMPREHEN METABOLIC PANEL: CPT

## 2020-01-15 PROCEDURE — 82947 ASSAY GLUCOSE BLOOD QUANT: CPT

## 2020-01-15 PROCEDURE — 87798 DETECT AGENT NOS DNA AMP: CPT

## 2020-01-15 PROCEDURE — 99284 EMERGENCY DEPT VISIT MOD MDM: CPT | Mod: 25

## 2020-01-15 PROCEDURE — 83605 ASSAY OF LACTIC ACID: CPT

## 2020-01-15 PROCEDURE — 87581 M.PNEUMON DNA AMP PROBE: CPT

## 2020-01-15 PROCEDURE — 82330 ASSAY OF CALCIUM: CPT

## 2020-01-15 PROCEDURE — 83735 ASSAY OF MAGNESIUM: CPT

## 2020-01-15 PROCEDURE — 71045 X-RAY EXAM CHEST 1 VIEW: CPT

## 2020-01-15 PROCEDURE — 85027 COMPLETE CBC AUTOMATED: CPT

## 2020-01-15 PROCEDURE — 85014 HEMATOCRIT: CPT

## 2020-01-15 PROCEDURE — 84132 ASSAY OF SERUM POTASSIUM: CPT

## 2020-01-15 PROCEDURE — 87633 RESP VIRUS 12-25 TARGETS: CPT

## 2020-01-15 PROCEDURE — 84295 ASSAY OF SERUM SODIUM: CPT

## 2020-01-15 RX ORDER — ONDANSETRON 8 MG/1
4 TABLET, FILM COATED ORAL ONCE
Refills: 0 | Status: DISCONTINUED | OUTPATIENT
Start: 2020-01-15 | End: 2020-01-15

## 2020-01-15 RX ORDER — ONDANSETRON 8 MG/1
4 TABLET, FILM COATED ORAL ONCE
Refills: 0 | Status: COMPLETED | OUTPATIENT
Start: 2020-01-15 | End: 2020-01-15

## 2020-01-15 RX ORDER — SODIUM CHLORIDE 9 MG/ML
1000 INJECTION INTRAMUSCULAR; INTRAVENOUS; SUBCUTANEOUS
Refills: 0 | Status: DISCONTINUED | OUTPATIENT
Start: 2020-01-15 | End: 2020-02-02

## 2020-01-15 RX ORDER — METOCLOPRAMIDE HCL 10 MG
10 TABLET ORAL ONCE
Refills: 0 | Status: COMPLETED | OUTPATIENT
Start: 2020-01-15 | End: 2020-01-15

## 2020-01-15 RX ORDER — PANTOPRAZOLE SODIUM 20 MG/1
40 TABLET, DELAYED RELEASE ORAL ONCE
Refills: 0 | Status: COMPLETED | OUTPATIENT
Start: 2020-01-15 | End: 2020-01-15

## 2020-01-15 RX ORDER — SODIUM CHLORIDE 9 MG/ML
1000 INJECTION INTRAMUSCULAR; INTRAVENOUS; SUBCUTANEOUS ONCE
Refills: 0 | Status: COMPLETED | OUTPATIENT
Start: 2020-01-15 | End: 2020-01-15

## 2020-01-15 RX ADMIN — Medication 10 MILLIGRAM(S): at 15:00

## 2020-01-15 RX ADMIN — PANTOPRAZOLE SODIUM 40 MILLIGRAM(S): 20 TABLET, DELAYED RELEASE ORAL at 15:30

## 2020-01-15 RX ADMIN — SODIUM CHLORIDE 1000 MILLILITER(S): 9 INJECTION INTRAMUSCULAR; INTRAVENOUS; SUBCUTANEOUS at 14:19

## 2020-01-15 RX ADMIN — ONDANSETRON 4 MILLIGRAM(S): 8 TABLET, FILM COATED ORAL at 18:46

## 2020-01-15 NOTE — ED ADULT NURSE NOTE - CHPI ED NUR SYMPTOMS NEG
no abdominal distension/no blood in stool/no burning urination/no diarrhea/no fever/no chills/no hematuria/no dysuria

## 2020-01-15 NOTE — ED PROVIDER NOTE - RAPID ASSESSMENT
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 Rectal bleeding at baseline. Last zofran 8mg at 1pm. Darker stool than normal, otherwise diarrhea at baseline. Denies fevers, recent travel.   Pt appears uncomfortable, vomiting in triage. Will place IV, IVF, zofran, labs.

## 2020-01-15 NOTE — ED ADULT NURSE NOTE - OBJECTIVE STATEMENT
63 yo F aaox4 C/o of vomiting PMH squamous cell carcinoma on chemo and gets Radiation daily. Pt reporting, dark stools, and rectal pain .  IV line placed. Labs drawn in triage. EKG done. Provider at bedside.

## 2020-01-15 NOTE — ED PROVIDER NOTE - ENMT, MLM
Airway patent, Nasal mucosa clear. Mouth with normal mucosa. Throat has no vesicles, no oropharyngeal exudates and uvula is midline. Dr mucosa

## 2020-01-15 NOTE — ED PROVIDER NOTE - ATTENDING CONTRIBUTION TO CARE
I have seen and evaluated this patient with the [facility] practice clinician.   I agree with the findings  unless other wise stated. I have amended notes where needed.  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 Rectal bleeding at baseline. Last zofran 8mg at 1pm. Darker stool than normal, otherwise diarrhea at baseline. Denies fevers, recent travel.   Pt appears uncomfortable, vomiting in triage. Will place IV, IVF, zofran, labs. re eval --Desai

## 2020-01-15 NOTE — ED PROVIDER NOTE - PROGRESS NOTE DETAILS
patient prefers not to stay, minimally improved, will PO challenge. - Alee Rendon PA-C patient tolerated PO, would like one additional dose of zofran and then go home. - Alee Rendon PA-C

## 2020-01-15 NOTE — ED CLERICAL - NS ED CLERK NOTE PRE-ARRIVAL INFORMATION; ADDITIONAL PRE-ARRIVAL INFORMATION
CC/Reason For referral: history of rectal ca , on chemo and radiation . severe vomiting not relieved by meds at home  Preferred Consultant(if applicable): na  Who admits for you (if needed): abrudescu  Do you have documents you would like to fax over? no  Would you still like to speak to an ED attending? yes , call with dispo

## 2020-01-15 NOTE — ED PROVIDER NOTE - PATIENT PORTAL LINK FT
You can access the FollowMyHealth Patient Portal offered by [facility] by registering at the following website: [email or web address]. By joining [employer]’s FollowMyHealth portal, you will also be able to view your health information using other applications (apps) compatible with our system.

## 2020-01-15 NOTE — ED PROVIDER NOTE - NSFOLLOWUPINSTRUCTIONS_ED_ALL_ED_FT
Follow up with your Primary Care Physician within the next 2-3 days  Bring a copy of your test results with you to your appointment  Continue your current medication regimen  Return to the Emergency Room if you experience new or worsening symptoms  stay hydrated  bland diet

## 2020-01-15 NOTE — ED PROVIDER NOTE - OBJECTIVE STATEMENT
Pt with persistent vomiting since yesterday no significantly helped by medicines at home vomitus clear or any attempted foods No blood in vomitus No obstipation h/o anus cancer started on chemo yesterday Feels weak dizzy No LOC No abdominal cramps No fever

## 2020-01-15 NOTE — ED PROVIDER NOTE - CLINICAL SUMMARY MEDICAL DECISION MAKING FREE TEXT BOX
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 Rectal bleeding at baseline. Last zofran 8mg at 1pm. Darker stool than normal, otherwise diarrhea at baseline. Denies fevers, recent travel.   Pt appears uncomfortable, vomiting in triage. Will place IV, IVF, zofran, labs. ill appearing dehydration+ abdomen soft Non focal neuro exam likely 2/2 chemo IV hydration zofran H2 blocker re eval  --Desai

## 2020-01-15 NOTE — ED ADULT NURSE REASSESSMENT NOTE - NS ED NURSE REASSESS COMMENT FT1
Patient seen by qRN. Blood work obtained and sent to lab, RVP done, patient instructed to keep mask on. Unable to obtain IV access by 2 attempts.

## 2020-01-15 NOTE — ED PROVIDER NOTE - SEVERE SEPSIS ALERT DETAILS
- Alee Rendon PA-C :  have seen and evaluated this patient and do not believe the patient is septic at this time.  I will continue to evaluate.

## 2020-01-16 NOTE — PATIENT PROFILE ADULT - NSPROGENPREVTRANSF_GEN_A_NUR
Fax received with lab results from labs ordered for worsening confusion.    Na 123.    UA with mild WBC and 3+LE.  Cx negative x 24 hrs.  The hyponatremia is most likely to be reason for confusion and advise hospitalization for correction.  He is noted to be on DDAVP and this would be the most likely cause of hyponatremia but advise he be in a monitored setting for sodium correction.       no

## 2020-01-20 LAB
CULTURE RESULTS: SIGNIFICANT CHANGE UP
SPECIMEN SOURCE: SIGNIFICANT CHANGE UP

## 2020-05-11 ENCOUNTER — APPOINTMENT (OUTPATIENT)
Dept: SURGERY | Facility: CLINIC | Age: 65
End: 2020-05-11
Payer: COMMERCIAL

## 2020-05-11 VITALS — HEIGHT: 66.5 IN | BODY MASS INDEX: 20.17 KG/M2 | WEIGHT: 127 LBS

## 2020-05-11 PROCEDURE — 99213 OFFICE O/P EST LOW 20 MIN: CPT

## 2020-05-11 RX ORDER — TRAMADOL HYDROCHLORIDE 50 MG/1
TABLET, COATED ORAL
Refills: 0 | Status: ACTIVE | COMMUNITY

## 2020-05-11 RX ORDER — PROCHLORPERAZINE MALEATE 5 MG/1
TABLET, FILM COATED ORAL
Refills: 0 | Status: ACTIVE | COMMUNITY

## 2020-08-10 ENCOUNTER — APPOINTMENT (OUTPATIENT)
Dept: SURGERY | Facility: CLINIC | Age: 65
End: 2020-08-10

## 2020-09-08 ENCOUNTER — APPOINTMENT (OUTPATIENT)
Dept: ORTHOPEDIC SURGERY | Facility: CLINIC | Age: 65
End: 2020-09-08
Payer: COMMERCIAL

## 2020-09-08 VITALS — HEART RATE: 80 BPM | DIASTOLIC BLOOD PRESSURE: 76 MMHG | SYSTOLIC BLOOD PRESSURE: 111 MMHG | TEMPERATURE: 98 F

## 2020-09-08 PROCEDURE — 99215 OFFICE O/P EST HI 40 MIN: CPT

## 2020-09-15 ENCOUNTER — APPOINTMENT (OUTPATIENT)
Dept: ORTHOPEDIC SURGERY | Facility: CLINIC | Age: 65
End: 2020-09-15
Payer: COMMERCIAL

## 2020-09-15 VITALS
HEIGHT: 66.5 IN | DIASTOLIC BLOOD PRESSURE: 71 MMHG | BODY MASS INDEX: 20.17 KG/M2 | SYSTOLIC BLOOD PRESSURE: 104 MMHG | TEMPERATURE: 97.6 F | HEART RATE: 78 BPM | WEIGHT: 127 LBS

## 2020-09-15 PROCEDURE — 73080 X-RAY EXAM OF ELBOW: CPT | Mod: RT

## 2020-09-15 PROCEDURE — 99213 OFFICE O/P EST LOW 20 MIN: CPT

## 2020-10-12 NOTE — PATIENT PROFILE ADULT - NSPROSPIRITUALVALUESFT_GEN_A_NUR
Mom has questions about breastfeeding. Mom states that baby is doing well at the 2 wk appt and gained 1 #. Mom is concerned because baby has green stools. The only change is that she started taking her prenatal vitamin. Mom has been BF on each breast for 15 minutes and not fully emptying her breast. This is most likely the concern, mom did not report a fast letdown.  Mom will try emptying one breast and offer the other side if showing cues. Mom instructed to call back in the next 48 hrs with an update. Brianda CHRISTIANSENN, RN, IBCLC     no

## 2020-10-13 ENCOUNTER — APPOINTMENT (OUTPATIENT)
Dept: ORTHOPEDIC SURGERY | Facility: CLINIC | Age: 65
End: 2020-10-13
Payer: COMMERCIAL

## 2020-10-13 VITALS — HEART RATE: 67 BPM | SYSTOLIC BLOOD PRESSURE: 114 MMHG | TEMPERATURE: 97.7 F | DIASTOLIC BLOOD PRESSURE: 78 MMHG

## 2020-10-13 DIAGNOSIS — S52.131A DISPLACED FRACTURE OF NECK OF RIGHT RADIUS, INITIAL ENCOUNTER FOR CLOSED FRACTURE: ICD-10-CM

## 2020-10-13 PROCEDURE — 73080 X-RAY EXAM OF ELBOW: CPT | Mod: RT

## 2020-10-13 PROCEDURE — 99213 OFFICE O/P EST LOW 20 MIN: CPT

## 2020-12-30 ENCOUNTER — APPOINTMENT (OUTPATIENT)
Dept: ORTHOPEDIC SURGERY | Facility: CLINIC | Age: 65
End: 2020-12-30

## 2021-03-23 ENCOUNTER — APPOINTMENT (OUTPATIENT)
Dept: ORTHOPEDIC SURGERY | Facility: CLINIC | Age: 66
End: 2021-03-23
Payer: MEDICARE

## 2021-03-23 VITALS — TEMPERATURE: 97.9 F | WEIGHT: 127 LBS | BODY MASS INDEX: 20.17 KG/M2 | HEIGHT: 66.5 IN

## 2021-03-23 PROCEDURE — 99215 OFFICE O/P EST HI 40 MIN: CPT | Mod: 25

## 2021-03-23 PROCEDURE — 20610 DRAIN/INJ JOINT/BURSA W/O US: CPT | Mod: 50

## 2021-03-23 PROCEDURE — 73564 X-RAY EXAM KNEE 4 OR MORE: CPT | Mod: 50

## 2021-03-23 RX ADMIN — Medication 2 MG/2ML: at 00:00

## 2021-03-30 ENCOUNTER — APPOINTMENT (OUTPATIENT)
Dept: ORTHOPEDIC SURGERY | Facility: CLINIC | Age: 66
End: 2021-03-30
Payer: MEDICARE

## 2021-03-30 VITALS — HEIGHT: 66.5 IN | WEIGHT: 127 LBS | TEMPERATURE: 97.5 F | BODY MASS INDEX: 20.17 KG/M2

## 2021-03-30 PROCEDURE — 20610 DRAIN/INJ JOINT/BURSA W/O US: CPT | Mod: 50

## 2021-03-30 RX ADMIN — Medication 2 MG/2ML: at 00:00

## 2021-04-01 RX ORDER — HYALURONATE SODIUM 20 MG/2 ML
20 SYRINGE (ML) INTRAARTICULAR
Refills: 0 | Status: COMPLETED | OUTPATIENT
Start: 2021-03-30

## 2021-04-05 ENCOUNTER — APPOINTMENT (OUTPATIENT)
Dept: ORTHOPEDIC SURGERY | Facility: CLINIC | Age: 66
End: 2021-04-05
Payer: MEDICARE

## 2021-04-05 VITALS — SYSTOLIC BLOOD PRESSURE: 116 MMHG | TEMPERATURE: 97.8 F | HEART RATE: 74 BPM | DIASTOLIC BLOOD PRESSURE: 76 MMHG

## 2021-04-05 PROCEDURE — 20610 DRAIN/INJ JOINT/BURSA W/O US: CPT | Mod: 50

## 2021-04-05 RX ORDER — HYALURONATE SODIUM 20 MG/2 ML
20 SYRINGE (ML) INTRAARTICULAR
Qty: 0 | Refills: 0 | Status: COMPLETED | OUTPATIENT
Start: 2021-04-05

## 2021-04-05 RX ADMIN — Medication 2 MG/2ML: at 00:00

## 2021-04-13 NOTE — H&P ADULT - DOES THIS PATIENT HAVE A HISTORY OF OR HAS BEEN DX WITH HEART FAILURE?
Called to schedule echo referral from [facility]. Result images were to be sent. Patient is now having echo done at [facility].     Electronically signed by Laisha Ortiz on 4/13/2021 at 9:37 AM
no

## 2021-04-25 NOTE — DISCHARGE NOTE ADULT - CONTRAINDICATIONS & PRECAUTIONS (SELECT ALL THAT APPLY)
Elder Saenz  UROLOGY  [address] [facility], [address] [place] [address]  Phone: [phone]  Fax: [phone]  Follow Up Time:     Olvin Reza)  Urology  [address] [address]  Phone: [phone]  Fax: [phone]  Follow Up Time:   
Patient/surrogate refused vaccine...

## 2021-04-26 NOTE — ED ADULT NURSE NOTE - NSFALLRSKPASTHIST_ED_ALL_ED
Impression:  Encounter for surgical aftercare following surgery on a sense organ  Z48.810. Plan: DC Predinisolone.  Patent LPI/Good results
no

## 2021-05-13 RX ORDER — HYALURONATE SODIUM 20 MG/2 ML
20 SYRINGE (ML) INTRAARTICULAR
Refills: 0 | Status: COMPLETED | OUTPATIENT
Start: 2021-03-23

## 2021-05-14 RX ORDER — HYALURONATE SODIUM 20 MG/2 ML
20 SYRINGE (ML) INTRAARTICULAR
Refills: 0 | Status: COMPLETED | OUTPATIENT
Start: 2021-03-23

## 2021-07-14 ENCOUNTER — APPOINTMENT (OUTPATIENT)
Dept: ORTHOPEDIC SURGERY | Facility: CLINIC | Age: 66
End: 2021-07-14
Payer: MEDICARE

## 2021-07-14 VITALS — DIASTOLIC BLOOD PRESSURE: 70 MMHG | SYSTOLIC BLOOD PRESSURE: 110 MMHG | HEART RATE: 64 BPM

## 2021-07-14 DIAGNOSIS — M71.331 OTHER BURSAL CYST, RIGHT WRIST: ICD-10-CM

## 2021-07-14 DIAGNOSIS — M65.4 RADIAL STYLOID TENOSYNOVITIS [DE QUERVAIN]: ICD-10-CM

## 2021-07-14 PROCEDURE — 20550 NJX 1 TENDON SHEATH/LIGAMENT: CPT | Mod: RT

## 2021-07-14 PROCEDURE — 73110 X-RAY EXAM OF WRIST: CPT | Mod: RT

## 2021-07-14 PROCEDURE — 99214 OFFICE O/P EST MOD 30 MIN: CPT | Mod: 25

## 2021-07-14 RX ADMIN — METHYLPREDNISOLONE ACETATE 0.75 MG/ML: 40 INJECTION, SUSPENSION INTRALESIONAL; INTRAMUSCULAR; INTRASYNOVIAL; SOFT TISSUE at 00:00

## 2021-07-14 RX ADMIN — LIDOCAINE HYDROCHLORIDE 0.75 %: 10 INJECTION, SOLUTION INFILTRATION; PERINEURAL at 00:00

## 2021-07-15 RX ORDER — LIDOCAINE HYDROCHLORIDE 10 MG/ML
1 INJECTION, SOLUTION INFILTRATION; PERINEURAL
Qty: 0 | Refills: 0 | Status: COMPLETED | OUTPATIENT
Start: 2021-07-14

## 2021-07-15 RX ORDER — METHYLPRED ACET/NACL,ISO-OS/PF 40 MG/ML
40 VIAL (ML) INJECTION
Qty: 1 | Refills: 0 | Status: COMPLETED | OUTPATIENT
Start: 2021-07-14

## 2021-07-19 PROBLEM — M65.4 DE QUERVAIN'S TENOSYNOVITIS, RIGHT: Status: ACTIVE | Noted: 2021-07-19

## 2021-07-19 PROBLEM — M71.331 SYNOVIAL CYST OF WRIST, RIGHT: Status: ACTIVE | Noted: 2021-07-19

## 2021-07-22 NOTE — PROGRESS NOTE ADULT - PROBLEM/PLAN-1
ADRIÁN Snyder DISPLAY PLAN FREE TEXT

## 2021-11-02 ENCOUNTER — APPOINTMENT (OUTPATIENT)
Dept: ORTHOPEDIC SURGERY | Facility: CLINIC | Age: 66
End: 2021-11-02

## 2021-11-22 NOTE — PATIENT PROFILE ADULT - INDICATE TYPE
On medications,  no chest pain, stable, monitored and followed up by primary team/cardiology team Health Care Proxy (HCP)

## 2021-12-07 NOTE — ED ADULT TRIAGE NOTE - TEMPERATURE IN FAHRENHEIT (DEGREES F)
----- Message from Timmy Pickard MD sent at 12/7/2021 12:23 PM CST -----  Results reassuring, bad cholesterol slightly elevated however good cholesterol also elevated.  Overall, as long as blood pressure well controlled, medication to lower cholesterol and improve cardiovascular risk not indicated.  Additionally, mammogram BI-RADS 2, recommend follow-up in 1 year.  
Called pt and informed her of what Dr Pickard said. She voiced understanding.  
98.3

## 2022-08-02 ENCOUNTER — APPOINTMENT (OUTPATIENT)
Dept: ORTHOPEDIC SURGERY | Facility: CLINIC | Age: 67
End: 2022-08-02

## 2022-08-17 NOTE — ED ADULT NURSE NOTE - CADM POA PRESS ULCER
[facility] [place], [place] [employer]  SPORTS QUALIFYING PHYSICAL EXAMINATION    Chidi Guardado                                      [birth year]22 2005  [id number] [facility] [id number]        I certify that the above named student has been medically evaluated and is deemed to be physically fit to: (1) Chidi Guardado is allowed to participate in all interscholastic activities     Additional recommendations for the school or parents: none    I have examined the above named student and completed the sports clearance exam as required by the [employer].  A copy of the physical exam is on record in my office and can be made available to the school at the request of the parents.    Valid for 3 years from date below with a normal Annual Health Questionnaire.        _______________________________                                      8/17/2022      Lizbeth Elder MD                                                         No

## 2022-10-09 NOTE — ED PROVIDER NOTE - CHIEF COMPLAINT
The patient is a 63y Female complaining of abdominal pain. Pt arrives to ED via EMS c/o back pain which is chronic.  Pt reports her BP was elevated at home however is normal in triage.  Pt takes blood thinners for factor 5 Leiden, DM.   fs = 106

## 2022-12-05 ENCOUNTER — NON-APPOINTMENT (OUTPATIENT)
Age: 67
End: 2022-12-05

## 2022-12-05 ENCOUNTER — APPOINTMENT (OUTPATIENT)
Dept: ORTHOPEDIC SURGERY | Facility: CLINIC | Age: 67
End: 2022-12-05

## 2022-12-05 VITALS — DIASTOLIC BLOOD PRESSURE: 78 MMHG | SYSTOLIC BLOOD PRESSURE: 117 MMHG | HEART RATE: 86 BPM

## 2022-12-05 PROCEDURE — 99215 OFFICE O/P EST HI 40 MIN: CPT | Mod: 25

## 2022-12-05 PROCEDURE — 20611 DRAIN/INJ JOINT/BURSA W/US: CPT | Mod: 50

## 2022-12-05 PROCEDURE — 73564 X-RAY EXAM KNEE 4 OR MORE: CPT | Mod: 50

## 2022-12-05 RX ORDER — HYALURONATE SODIUM 20 MG/2 ML
20 SYRINGE (ML) INTRAARTICULAR
Refills: 0 | Status: COMPLETED | OUTPATIENT
Start: 2022-12-05

## 2022-12-05 RX ADMIN — Medication 2 MG/2ML: at 00:00

## 2022-12-14 ENCOUNTER — APPOINTMENT (OUTPATIENT)
Dept: ORTHOPEDIC SURGERY | Facility: CLINIC | Age: 67
End: 2022-12-14

## 2022-12-14 VITALS — SYSTOLIC BLOOD PRESSURE: 119 MMHG | DIASTOLIC BLOOD PRESSURE: 79 MMHG | HEART RATE: 77 BPM

## 2022-12-14 PROCEDURE — 20611 DRAIN/INJ JOINT/BURSA W/US: CPT | Mod: 50

## 2022-12-14 RX ORDER — HYALURONATE SODIUM 20 MG/2 ML
20 SYRINGE (ML) INTRAARTICULAR
Refills: 0 | Status: COMPLETED | OUTPATIENT
Start: 2022-12-14

## 2022-12-14 RX ADMIN — Medication 2 MG/2ML: at 00:00

## 2022-12-21 ENCOUNTER — APPOINTMENT (OUTPATIENT)
Dept: ORTHOPEDIC SURGERY | Facility: CLINIC | Age: 67
End: 2022-12-21

## 2022-12-21 VITALS — DIASTOLIC BLOOD PRESSURE: 74 MMHG | SYSTOLIC BLOOD PRESSURE: 110 MMHG | HEART RATE: 83 BPM

## 2022-12-21 PROCEDURE — 20611 DRAIN/INJ JOINT/BURSA W/US: CPT | Mod: 50

## 2022-12-21 RX ORDER — HYALURONATE SODIUM 20 MG/2 ML
20 SYRINGE (ML) INTRAARTICULAR
Refills: 0 | Status: COMPLETED | OUTPATIENT
Start: 2022-12-21

## 2022-12-21 RX ADMIN — Medication 2 MG/2ML: at 00:00

## 2023-02-21 ENCOUNTER — APPOINTMENT (OUTPATIENT)
Dept: ORTHOPEDIC SURGERY | Facility: CLINIC | Age: 68
End: 2023-02-21
Payer: MEDICARE

## 2023-02-21 VITALS — DIASTOLIC BLOOD PRESSURE: 79 MMHG | SYSTOLIC BLOOD PRESSURE: 136 MMHG | HEART RATE: 90 BPM

## 2023-02-21 PROCEDURE — 99213 OFFICE O/P EST LOW 20 MIN: CPT

## 2023-02-21 RX ORDER — MELOXICAM 15 MG/1
15 TABLET ORAL
Qty: 30 | Refills: 0 | Status: ACTIVE | COMMUNITY
Start: 2023-02-21 | End: 1900-01-01

## 2023-02-22 ENCOUNTER — APPOINTMENT (OUTPATIENT)
Dept: MRI IMAGING | Facility: IMAGING CENTER | Age: 68
End: 2023-02-22
Payer: MEDICARE

## 2023-02-22 ENCOUNTER — OUTPATIENT (OUTPATIENT)
Dept: OUTPATIENT SERVICES | Facility: HOSPITAL | Age: 68
LOS: 1 days | End: 2023-02-22
Payer: MEDICARE

## 2023-02-22 DIAGNOSIS — M17.12 UNILATERAL PRIMARY OSTEOARTHRITIS, LEFT KNEE: ICD-10-CM

## 2023-02-22 PROCEDURE — 73721 MRI JNT OF LWR EXTRE W/O DYE: CPT | Mod: 26,LT,MH

## 2023-02-22 PROCEDURE — 73721 MRI JNT OF LWR EXTRE W/O DYE: CPT

## 2023-02-27 ENCOUNTER — APPOINTMENT (OUTPATIENT)
Dept: ORTHOPEDIC SURGERY | Facility: CLINIC | Age: 68
End: 2023-02-27
Payer: MEDICARE

## 2023-02-27 VITALS — HEART RATE: 73 BPM | SYSTOLIC BLOOD PRESSURE: 110 MMHG | DIASTOLIC BLOOD PRESSURE: 77 MMHG

## 2023-02-27 PROCEDURE — 20611 DRAIN/INJ JOINT/BURSA W/US: CPT | Mod: LT

## 2023-02-27 PROCEDURE — 99214 OFFICE O/P EST MOD 30 MIN: CPT | Mod: 25

## 2023-02-27 RX ORDER — METHYLPRED ACET/NACL,ISO-OS/PF 40 MG/ML
40 VIAL (ML) INJECTION
Qty: 1 | Refills: 0 | Status: COMPLETED | OUTPATIENT
Start: 2023-02-27

## 2023-02-27 RX ORDER — LIDOCAINE HYDROCHLORIDE 10 MG/ML
1 INJECTION, SOLUTION INFILTRATION; PERINEURAL
Refills: 0 | Status: COMPLETED | OUTPATIENT
Start: 2023-02-27

## 2023-02-27 RX ADMIN — LIDOCAINE HYDROCHLORIDE 3 %: 10 INJECTION, SOLUTION INFILTRATION; PERINEURAL at 00:00

## 2023-02-27 RX ADMIN — METHYLPREDNISOLONE ACETATE 2 MG/ML: 40 INJECTION, SUSPENSION INTRA-ARTICULAR; INTRALESIONAL; INTRAMUSCULAR; SOFT TISSUE at 00:00

## 2023-04-11 ENCOUNTER — APPOINTMENT (OUTPATIENT)
Dept: ORTHOPEDIC SURGERY | Facility: CLINIC | Age: 68
End: 2023-04-11

## 2023-07-27 ENCOUNTER — OFFICE (OUTPATIENT)
Dept: URBAN - METROPOLITAN AREA CLINIC 90 | Facility: CLINIC | Age: 68
Setting detail: OPHTHALMOLOGY
End: 2023-07-27
Payer: MEDICARE

## 2023-07-27 DIAGNOSIS — H25.13: ICD-10-CM

## 2023-07-27 DIAGNOSIS — H01.004: ICD-10-CM

## 2023-07-27 DIAGNOSIS — H35.362: ICD-10-CM

## 2023-07-27 DIAGNOSIS — H43.811: ICD-10-CM

## 2023-07-27 DIAGNOSIS — H16.223: ICD-10-CM

## 2023-07-27 DIAGNOSIS — H40.013: ICD-10-CM

## 2023-07-27 DIAGNOSIS — H01.005: ICD-10-CM

## 2023-07-27 DIAGNOSIS — H01.002: ICD-10-CM

## 2023-07-27 DIAGNOSIS — H43.391: ICD-10-CM

## 2023-07-27 DIAGNOSIS — H01.001: ICD-10-CM

## 2023-07-27 PROCEDURE — 92004 COMPRE OPH EXAM NEW PT 1/>: CPT | Performed by: OPHTHALMOLOGY

## 2023-07-27 PROCEDURE — 92083 EXTENDED VISUAL FIELD XM: CPT | Performed by: OPHTHALMOLOGY

## 2023-07-27 PROCEDURE — 92133 CPTRZD OPH DX IMG PST SGM ON: CPT | Performed by: OPHTHALMOLOGY

## 2023-07-27 ASSESSMENT — KERATOMETRY
OS_AXISANGLE_DEGREES: 117
OS_K2POWER_DIOPTERS: 2.75
OD_K1POWER_DIOPTERS: 42.00
OS_K1POWER_DIOPTERS: 42.00
OD_K2POWER_DIOPTERS: 42.50
OD_AXISANGLE_DEGREES: 59

## 2023-07-27 ASSESSMENT — REFRACTION_CURRENTRX
OD_VPRISM_DIRECTION: BF
OS_VPRISM_DIRECTION: BF
OD_CYLINDER: -SPH
OD_OVR_VA: 20/
OS_ADD: +2.25
OS_SPHERE: +2.25
OD_SPHERE: +2.50
OD_ADD: +2.25
OS_AXIS: 088
OS_OVR_VA: 20/
OS_CYLINDER: -0.25

## 2023-07-27 ASSESSMENT — TONOMETRY
OD_IOP_MMHG: 20
OS_IOP_MMHG: 20

## 2023-07-27 ASSESSMENT — CONFRONTATIONAL VISUAL FIELD TEST (CVF)
OS_FINDINGS: FULL
OD_FINDINGS: FULL

## 2023-07-27 ASSESSMENT — LID EXAM ASSESSMENTS
OS_BLEPHARITIS: LLL LUL 2+
OD_BLEPHARITIS: RLL RUL 2+

## 2023-07-27 ASSESSMENT — SPHEQUIV_DERIVED
OD_SPHEQUIV: 2.375
OS_SPHEQUIV: 2.25

## 2023-07-27 ASSESSMENT — REFRACTION_AUTOREFRACTION
OD_CYLINDER: -0.25
OD_AXIS: 101
OS_CYLINDER: -0.50
OS_SPHERE: +2.50
OS_AXIS: 083
OD_SPHERE: +2.50

## 2023-07-27 ASSESSMENT — AXIALLENGTH_DERIVED
OS_AL: 33.31
OD_AL: 23.1348

## 2023-07-27 ASSESSMENT — SUPERFICIAL PUNCTATE KERATITIS (SPK)
OS_SPK: 2+
OD_SPK: 2+

## 2023-07-27 ASSESSMENT — VISUAL ACUITY
OD_BCVA: 20/20
OS_BCVA: 20/20-2

## 2023-09-19 ENCOUNTER — APPOINTMENT (OUTPATIENT)
Dept: ORTHOPEDIC SURGERY | Facility: CLINIC | Age: 68
End: 2023-09-19
Payer: MEDICARE

## 2023-09-19 PROCEDURE — 20611 DRAIN/INJ JOINT/BURSA W/US: CPT | Mod: LT

## 2023-09-19 PROCEDURE — 99214 OFFICE O/P EST MOD 30 MIN: CPT | Mod: 25

## 2023-09-19 RX ORDER — LIDOCAINE HYDROCHLORIDE 5 MG/ML
0.5 INJECTION, SOLUTION INFILTRATION; PERINEURAL
Refills: 0 | Status: COMPLETED | OUTPATIENT
Start: 2023-09-19

## 2023-09-19 RX ORDER — METHYLPRED ACET/NACL,ISO-OS/PF 40 MG/ML
40 VIAL (ML) INJECTION
Qty: 1 | Refills: 0 | Status: COMPLETED | OUTPATIENT
Start: 2023-09-19

## 2023-09-19 RX ADMIN — METHYLPREDNISOLONE ACETATE 2 MG/ML: 40 INJECTION, SUSPENSION INTRA-ARTICULAR; INTRALESIONAL; INTRAMUSCULAR; SOFT TISSUE at 00:00

## 2023-09-19 RX ADMIN — LIDOCAINE HYDROCHLORIDE 3 %: 5 INJECTION, SOLUTION INFILTRATION; INTRAVENOUS at 00:00

## 2023-10-10 ENCOUNTER — APPOINTMENT (OUTPATIENT)
Dept: ORTHOPEDIC SURGERY | Facility: CLINIC | Age: 68
End: 2023-10-10
Payer: MEDICARE

## 2023-10-10 DIAGNOSIS — M67.432 GANGLION, LEFT WRIST: ICD-10-CM

## 2023-10-10 DIAGNOSIS — M25.539 PAIN IN UNSPECIFIED WRIST: ICD-10-CM

## 2023-10-10 DIAGNOSIS — M18.12 UNILATERAL PRIMARY OSTEOARTHRITIS OF FIRST CARPOMETACARPAL JOINT, LEFT HAND: ICD-10-CM

## 2023-10-10 PROCEDURE — 73110 X-RAY EXAM OF WRIST: CPT | Mod: LT

## 2023-10-10 PROCEDURE — 99214 OFFICE O/P EST MOD 30 MIN: CPT

## 2023-11-07 RX ORDER — MELOXICAM 15 MG/1
15 TABLET ORAL DAILY
Qty: 30 | Refills: 0 | Status: ACTIVE | COMMUNITY
Start: 2023-09-19 | End: 1900-01-01

## 2024-04-29 ENCOUNTER — APPOINTMENT (OUTPATIENT)
Dept: ORTHOPEDIC SURGERY | Facility: CLINIC | Age: 69
End: 2024-04-29
Payer: MEDICARE

## 2024-04-29 VITALS — SYSTOLIC BLOOD PRESSURE: 107 MMHG | DIASTOLIC BLOOD PRESSURE: 74 MMHG | HEART RATE: 76 BPM

## 2024-04-29 PROCEDURE — 73564 X-RAY EXAM KNEE 4 OR MORE: CPT | Mod: 50

## 2024-04-29 PROCEDURE — 99215 OFFICE O/P EST HI 40 MIN: CPT | Mod: 25

## 2024-04-29 PROCEDURE — 20611 DRAIN/INJ JOINT/BURSA W/US: CPT | Mod: LT

## 2024-04-29 RX ORDER — LIDOCAINE HYDROCHLORIDE 10 MG/ML
1 INJECTION, SOLUTION INFILTRATION; PERINEURAL
Refills: 0 | Status: COMPLETED | OUTPATIENT
Start: 2024-04-29

## 2024-04-29 RX ORDER — METHYLPRED ACET/NACL,ISO-OS/PF 40 MG/ML
40 VIAL (ML) INJECTION
Refills: 0 | Status: COMPLETED | OUTPATIENT
Start: 2024-04-29

## 2024-04-29 RX ADMIN — METHYLPREDNISOLONE ACETATE 2 MG/ML: 40 INJECTION, SUSPENSION INTRA-ARTICULAR; INTRALESIONAL; INTRAMUSCULAR; SOFT TISSUE at 00:00

## 2024-04-29 RX ADMIN — LIDOCAINE HYDROCHLORIDE 3 %: 10 INJECTION, SOLUTION EPIDURAL; INFILTRATION; INTRACAUDAL; PERINEURAL at 00:00

## 2024-04-29 NOTE — PROCEDURE
[de-identified] : Using sterile technique, 2cc of depomedrol 40mg/ml and 3cc of 1% plain lidocaine was drawn up into a sterile 5cc syringe.  The left knee was then sterilely prepped with chlorhexidine and ethylene chloride spray was used as an anesthetic prior to injection.  Under ultrasound guidance utilizing the Villasenor Lumify ultrasound probe the depomedrol/lidocaine mixture was injected into the knee joint just above and lateral to the patella into the suprapatellar pouch.  The injection was confirmed using ultrasound and a spot image was saved of the injection.  The patient tolerated the procedure well without difficulty.  The patient was given instructions on the use of ice and anti-inflammatories post injection site soreness.

## 2024-04-29 NOTE — HISTORY OF PRESENT ILLNESS
[de-identified] : This patient presents today for follow-up regarding bilateral osteoarthritis about the knees.  She did have a cortisone injection into the left knee last year which gave her some temporary improvement.  She presents today with recurrence of pain about both knees left worse than right.  Pain level varies from 8-10 out of 10.  Pain worse with activity such as ambulation and stair climbing.  Pain is improved with rest.  Patient notes buckling especially left knee.  She notes swelling about both knees left worse than the right.

## 2024-04-29 NOTE — PHYSICAL EXAM
[de-identified] : The patient appears well nourished  and in no apparent distress.  The patient is alert and oriented to person, place, and time.   Affect and mood appear normal.    The head is normocephalic and atraumatic.  The eyes reveal normal sclera and extra ocular muscles are intact.   The neck appears normal with no jugular venous distention or masses noted.   Skin shows normal turgor with no evidence of eczema or psoriasis.  No respiratory distress noted.  The patient ambulates with a normal gait.  The right and left knees flex to 120 degrees.  There is some pain with terminal flexion.  The right knee has full extension the left knee has a 5 degree flexion contracture.  There is valgus alignment about both knees.  There are crepitations noted bilaterally.  There is tenderness about the lateral joint bilaterally.. There is a negative [facility] sign. There is no effusion. There is no soft tissue swelling, warmth, or erythema.   There is a negative Lachman sign.  There is no instability to varus/valgus stress or anterior/posterior drawer.  There is normal strength in the in the quadriceps and hamstring muscles.  Sensation is intact to the lower estremity. There are normal pulses distally and good capillary refill. No edema or lymphadenopathy noted.    [de-identified] : AP, lateral, tunnel, and merchant views of the right and left knees were obtained.  There is valgus alignment bilaterally.  There is moderate lateral joint narrowing of the right knee and severe narrowing in the left knee.  Moderate patellofemoral arthritis noted bilaterally as well.  No fractures or dislocations are noted.

## 2024-04-29 NOTE — DISCUSSION/SUMMARY
[de-identified] : This patient presents today for follow-up regarding bilateral osteoarthritis about the knees.  Physical exam reveals evidence of significant bilateral osteoarthritis.  She is having worsening pain about the left knee per we discussed treatment options and I recommended a subsequent steroid injection to the left knee.  She does not wish to proceed with knee replacement surgery as she just had mastectomies within the last year.  I will see how she does with the injection.  She wished to proceed with the right knee injection on follow-up she will see us back in the next 1 to 2 weeks.  At least 40 minutes was spent performing the evaluation and management on today's office visit.  This includes but is not limited to preparing to see patient including review of any test results or outside medical records, obtaining and/or reviewing separately obtained history, performing examination and evaluation, counseling and educating the patient on their diagnosis and treatment recommendations, ordering medications, tests, or procedures, documenting clinical information in the electronic health record, independently interpreting results (not separately reported) and communicating results to the patient, and coordination of care.

## 2024-05-01 RX ORDER — MELOXICAM 15 MG/1
15 TABLET ORAL
Qty: 21 | Refills: 0 | Status: ACTIVE | COMMUNITY
Start: 2024-05-01 | End: 1900-01-01

## 2024-06-10 ENCOUNTER — APPOINTMENT (OUTPATIENT)
Dept: ORTHOPEDIC SURGERY | Facility: CLINIC | Age: 69
End: 2024-06-10
Payer: MEDICARE

## 2024-06-10 VITALS — DIASTOLIC BLOOD PRESSURE: 78 MMHG | SYSTOLIC BLOOD PRESSURE: 112 MMHG | HEART RATE: 79 BPM

## 2024-06-10 DIAGNOSIS — M17.11 UNILATERAL PRIMARY OSTEOARTHRITIS, RIGHT KNEE: ICD-10-CM

## 2024-06-10 DIAGNOSIS — M17.12 UNILATERAL PRIMARY OSTEOARTHRITIS, LEFT KNEE: ICD-10-CM

## 2024-06-10 PROCEDURE — 99214 OFFICE O/P EST MOD 30 MIN: CPT | Mod: 25

## 2024-06-10 PROCEDURE — 20611 DRAIN/INJ JOINT/BURSA W/US: CPT | Mod: RT

## 2024-06-10 RX ORDER — METHYLPRED ACET/NACL,ISO-OS/PF 40 MG/ML
40 VIAL (ML) INJECTION
Refills: 0 | Status: COMPLETED | OUTPATIENT
Start: 2024-06-10

## 2024-06-10 RX ORDER — LIDOCAINE HYDROCHLORIDE 10 MG/ML
1 INJECTION, SOLUTION INFILTRATION; PERINEURAL
Refills: 0 | Status: COMPLETED | OUTPATIENT
Start: 2024-06-10

## 2024-06-10 RX ADMIN — METHYLPREDNISOLONE ACETATE 2 MG/ML: 40 INJECTION, SUSPENSION INTRA-ARTICULAR; INTRALESIONAL; INTRAMUSCULAR; SOFT TISSUE at 00:00

## 2024-06-10 RX ADMIN — LIDOCAINE HYDROCHLORIDE 3 %: 10 INJECTION, SOLUTION INFILTRATION; PERINEURAL at 00:00

## 2024-06-10 NOTE — PHYSICAL EXAM
[de-identified] : The patient appears well nourished  and in no apparent distress.  The patient is alert and oriented to person, place, and time.   Affect and mood appear normal.    The head is normocephalic and atraumatic.  The eyes reveal normal sclera and extra ocular muscles are intact.   The neck appears normal with no jugular venous distention or masses noted.   Skin shows normal turgor with no evidence of eczema or psoriasis.  No respiratory distress noted.  The patient ambulates with a normal gait.  The right and left knees flex to 120 degrees.  There is some pain with terminal flexion.  The right knee has full extension the left knee has a 5 degree flexion contracture.  There is valgus alignment about both knees.  There are crepitations noted bilaterally.  There is tenderness about the lateral joint bilaterally.. There is a negative [facility] sign. There is no effusion. There is no soft tissue swelling, warmth, or erythema.   There is a negative Lachman sign.  There is no instability to varus/valgus stress or anterior/posterior drawer.  There is normal strength in the in the quadriceps and hamstring muscles.  Sensation is intact to the lower estremity. There are normal pulses distally and good capillary refill. No edema or lymphadenopathy noted.

## 2024-06-10 NOTE — DISCUSSION/SUMMARY
[de-identified] : On today's visit patient presents today for follow-up regarding osteoarthritis about both knees.  She is doing much better after the injection into the left knee.  We will continue to observe the left knee.  The right knee continues to bother him and on today's visit we injected Depo-Medrol lidocaine into the right knee under sterile conditions.  Instructions are given postinjection for ice analgesics and modification of activities.  I will see him back in the office if she has worsening of symptoms.  At least 30 minutes was spent performing the evaluation and management on today's office visit.  This includes but is not limited to preparing to see patient including review of any test results or outside medical records, obtaining and/or reviewing separately obtained history, performing examination and evaluation, counseling and educating the patient on their diagnosis and treatment recommendations, ordering medications, tests, or procedures, documenting clinical information in the electronic health record, independently interpreting results (not separately reported) and communicating results to the patient, and coordination of care.

## 2024-06-10 NOTE — PROCEDURE
[de-identified] : Using sterile technique, 2cc of depomedrol 40mg/ml and 3cc of 1% plain lidocaine was drawn up into a sterile 5cc syringe.  The right knee was then sterilely prepped with chlorhexidine and ethylene chloride spray was used as an anesthetic prior to injection.  Under ultrasound guidance utilizing the Villasenor Lumify ultrasound probe the depomedrol/lidocaine mixture was injected into the knee joint just above and lateral to the patella into the suprapatellar pouch.  The injection was confirmed using ultrasound and a spot image was saved of the injection.  The patient tolerated the procedure well without difficulty.  The patient was given instructions on the use of ice and anti-inflammatories post injection site soreness.

## 2024-07-11 ENCOUNTER — APPOINTMENT (OUTPATIENT)
Dept: MRI IMAGING | Facility: CLINIC | Age: 69
End: 2024-07-11

## 2024-07-16 ENCOUNTER — APPOINTMENT (OUTPATIENT)
Dept: ORTHOPEDIC SURGERY | Facility: CLINIC | Age: 69
End: 2024-07-16
Payer: MEDICARE

## 2024-07-16 VITALS — HEART RATE: 69 BPM | DIASTOLIC BLOOD PRESSURE: 72 MMHG | SYSTOLIC BLOOD PRESSURE: 114 MMHG

## 2024-07-16 DIAGNOSIS — M25.511 PAIN IN RIGHT SHOULDER: ICD-10-CM

## 2024-07-16 DIAGNOSIS — M17.12 UNILATERAL PRIMARY OSTEOARTHRITIS, LEFT KNEE: ICD-10-CM

## 2024-07-16 DIAGNOSIS — M17.11 UNILATERAL PRIMARY OSTEOARTHRITIS, RIGHT KNEE: ICD-10-CM

## 2024-07-16 DIAGNOSIS — M75.41 IMPINGEMENT SYNDROME OF RIGHT SHOULDER: ICD-10-CM

## 2024-07-16 PROCEDURE — 73030 X-RAY EXAM OF SHOULDER: CPT | Mod: RT

## 2024-07-16 PROCEDURE — 99214 OFFICE O/P EST MOD 30 MIN: CPT

## 2024-07-19 ENCOUNTER — RX RENEWAL (OUTPATIENT)
Age: 69
End: 2024-07-19

## 2024-09-05 NOTE — ED ADULT TRIAGE NOTE - STATUS:
I have attempted without success to contact this patient by phone to discuss lab results         ----- Message from Kaitlyn Monique MD sent at 9/5/2024  9:59 AM CDT -----  Age appropriate Gonorrhea/Chlamydia screen is negative. No further action required.           Applied

## 2024-11-13 ENCOUNTER — OFFICE (OUTPATIENT)
Age: 69
Setting detail: OPHTHALMOLOGY
End: 2024-11-13
Payer: MEDICARE

## 2024-11-13 DIAGNOSIS — H01.004: ICD-10-CM

## 2024-11-13 DIAGNOSIS — H16.223: ICD-10-CM

## 2024-11-13 DIAGNOSIS — H40.013: ICD-10-CM

## 2024-11-13 DIAGNOSIS — H01.001: ICD-10-CM

## 2024-11-13 DIAGNOSIS — H01.002: ICD-10-CM

## 2024-11-13 DIAGNOSIS — H35.362: ICD-10-CM

## 2024-11-13 DIAGNOSIS — H43.391: ICD-10-CM

## 2024-11-13 DIAGNOSIS — H43.811: ICD-10-CM

## 2024-11-13 DIAGNOSIS — H01.005: ICD-10-CM

## 2024-11-13 DIAGNOSIS — H25.13: ICD-10-CM

## 2024-11-13 PROCEDURE — 99213 OFFICE O/P EST LOW 20 MIN: CPT | Performed by: OPHTHALMOLOGY

## 2024-11-13 PROCEDURE — 92083 EXTENDED VISUAL FIELD XM: CPT | Performed by: OPHTHALMOLOGY

## 2024-11-13 PROCEDURE — 92133 CPTRZD OPH DX IMG PST SGM ON: CPT | Performed by: OPHTHALMOLOGY

## 2024-11-13 ASSESSMENT — REFRACTION_AUTOREFRACTION
OS_SPHERE: +2.75
OD_CYLINDER: -0.75
OS_CYLINDER: -0.50
OS_AXIS: 079
OD_AXIS: 091
OD_SPHERE: +3.50

## 2024-11-13 ASSESSMENT — REFRACTION_CURRENTRX
OD_VPRISM_DIRECTION: BF
OD_ADD: +2.25
OS_CYLINDER: -0.25
OS_SPHERE: +2.00
OD_OVR_VA: 20/
OS_VPRISM_DIRECTION: BF
OS_CYLINDER: -0.25
OD_OVR_VA: 20/
OS_AXIS: 140
OS_SPHERE: +2.25
OS_OVR_VA: 20/
OD_CYLINDER: 0.00
OD_SPHERE: +2.25
OS_ADD: +2.25
OS_AXIS: 088
OS_OVR_VA: 20/
OD_AXIS: 180
OD_ADD: +2.25
OD_VPRISM_DIRECTION: PROGS
OD_SPHERE: +2.50
OS_ADD: +2.25
OD_CYLINDER: -SPH
OS_VPRISM_DIRECTION: PROGS

## 2024-11-13 ASSESSMENT — KERATOMETRY
OS_K2POWER_DIOPTERS: 42.75
OD_AXISANGLE_DEGREES: 090
OS_K1POWER_DIOPTERS: 42.25
OD_K1POWER_DIOPTERS: 42.25
OD_K2POWER_DIOPTERS: 42.25
OS_AXISANGLE_DEGREES: 151

## 2024-11-13 ASSESSMENT — TONOMETRY
OD_IOP_MMHG: 17
OS_IOP_MMHG: 14

## 2024-11-13 ASSESSMENT — LID EXAM ASSESSMENTS
OD_BLEPHARITIS: RLL RUL 2+
OS_BLEPHARITIS: LLL LUL 2+

## 2024-11-13 ASSESSMENT — VISUAL ACUITY
OD_BCVA: 20/20-2
OS_BCVA: 20/20-2

## 2024-11-13 ASSESSMENT — SUPERFICIAL PUNCTATE KERATITIS (SPK)
OS_SPK: 1+
OD_SPK: 1+

## 2025-01-29 NOTE — PRE-ANESTHESIA EVALUATION ADULT - NSPROPOSEDPROCEDFT_GEN_ALL_CORE
Pt came via EMS from home c/o medication refill of Xanax. Per EMS, pt has been feeling anxious because she hasn't taken her Xanax for 3 days now.     Pt noted to be scratching her hands due to anxiety.    EKG done by EMS, sinus rhythm.  
exploratory laparatomy

## 2025-03-05 NOTE — ED PROVIDER NOTE - RADIATION
[de-identified] : Ms. HENRY GTZ is a 60 year old woman presents today for follow up evaluation of s/p left reverse total shoulder arthroplasty in [place] on 11/18/24. Since her last visit she states she is feeling better. She is participating in PT for the shoulder and hand therapy 2-3 times a week and is happy with her progress.  abdomen Additional Anesthesia Volume In Cc: 3

## 2025-04-14 ENCOUNTER — APPOINTMENT (OUTPATIENT)
Dept: ORTHOPEDIC SURGERY | Facility: CLINIC | Age: 70
End: 2025-04-14

## 2025-07-21 NOTE — ED PROVIDER NOTE - NS_EDPROVIDERDISPOUSERTYPE_ED_A_ED
COMPLETE PHYSICAL EXAMINATION    HISTORY    The patient is a 21 year old female who comes in for a complete physical exam.    1 year left at [employer] and [employer] cert.  Goal in [place].   Attending Attestation (For Attendings USE Only)...